# Patient Record
Sex: MALE | Race: WHITE | NOT HISPANIC OR LATINO | Employment: OTHER | URBAN - METROPOLITAN AREA
[De-identification: names, ages, dates, MRNs, and addresses within clinical notes are randomized per-mention and may not be internally consistent; named-entity substitution may affect disease eponyms.]

---

## 2018-03-26 LAB
HCG, QUANTITATIVE (HISTORICAL): 0.98 MU/ML (ref 0–0.5)
LDH (HISTORICAL): 146 IU/L (ref 84–246)

## 2018-03-27 LAB — AFP-TUMOR MARKER (HISTORICAL): 6 NG/ML (ref 0–8.3)

## 2018-08-28 ENCOUNTER — TRANSCRIBE ORDERS (OUTPATIENT)
Dept: ADMINISTRATIVE | Facility: HOSPITAL | Age: 68
End: 2018-08-28

## 2018-08-28 ENCOUNTER — APPOINTMENT (OUTPATIENT)
Dept: LAB | Facility: HOSPITAL | Age: 68
End: 2018-08-28
Attending: INTERNAL MEDICINE
Payer: MEDICARE

## 2018-08-28 DIAGNOSIS — R97.8 ABNORMAL TUMOR MARKERS: ICD-10-CM

## 2018-08-28 DIAGNOSIS — Z85.828 PERSONAL HISTORY OF OTHER MALIGNANT NEOPLASM OF SKIN: ICD-10-CM

## 2018-08-28 DIAGNOSIS — C62.11 MALIGNANT NEOPLASM OF DESCENDED RIGHT TESTIS (HCC): Primary | ICD-10-CM

## 2018-08-28 DIAGNOSIS — C62.11 MALIGNANT NEOPLASM OF DESCENDED RIGHT TESTIS (HCC): ICD-10-CM

## 2018-08-28 LAB
AFP-TM SERPL-MCNC: 5.9 NG/ML (ref 0.5–8)
ALBUMIN SERPL BCP-MCNC: 3.7 G/DL (ref 3.5–5.7)
ALP SERPL-CCNC: 41 U/L (ref 55–165)
ALT SERPL W P-5'-P-CCNC: 15 U/L (ref 7–52)
ANION GAP SERPL CALCULATED.3IONS-SCNC: 8 MMOL/L (ref 4–13)
AST SERPL W P-5'-P-CCNC: 16 U/L (ref 13–39)
BASOPHILS # BLD AUTO: 0 THOUSANDS/ΜL (ref 0–0.1)
BASOPHILS NFR BLD AUTO: 1 % (ref 0–2)
BILIRUB SERPL-MCNC: 0.7 MG/DL (ref 0.2–1)
BUN SERPL-MCNC: 12 MG/DL (ref 7–25)
CALCIUM SERPL-MCNC: 9.3 MG/DL (ref 8.6–10.5)
CHLORIDE SERPL-SCNC: 96 MMOL/L (ref 98–107)
CO2 SERPL-SCNC: 27 MMOL/L (ref 21–31)
CREAT SERPL-MCNC: 0.99 MG/DL (ref 0.7–1.3)
EOSINOPHIL # BLD AUTO: 0.2 THOUSAND/ΜL (ref 0–0.61)
EOSINOPHIL NFR BLD AUTO: 4 % (ref 0–5)
ERYTHROCYTE [DISTWIDTH] IN BLOOD BY AUTOMATED COUNT: 13 % (ref 11.5–14.5)
GFR SERPL CREATININE-BSD FRML MDRD: 77 ML/MIN/1.73SQ M
GLUCOSE P FAST SERPL-MCNC: 102 MG/DL (ref 65–99)
HCG-TM SERPL-SCNC: 1.01 MLU/ML
HCT VFR BLD AUTO: 38.3 % (ref 36.5–49.3)
HGB BLD-MCNC: 13 G/DL (ref 14–18)
LDH SERPL-CCNC: 169 U/L (ref 84–246)
LYMPHOCYTES # BLD AUTO: 1.1 THOUSANDS/ΜL (ref 0.6–4.47)
LYMPHOCYTES NFR BLD AUTO: 21 % (ref 21–51)
MCH RBC QN AUTO: 32.7 PG (ref 26–34)
MCHC RBC AUTO-ENTMCNC: 33.8 G/DL (ref 31–37)
MCV RBC AUTO: 97 FL (ref 81–99)
MONOCYTES # BLD AUTO: 0.7 THOUSAND/ΜL (ref 0.17–1.22)
MONOCYTES NFR BLD AUTO: 14 % (ref 2–12)
NEUTROPHILS # BLD AUTO: 3.2 THOUSANDS/ΜL (ref 1.4–6.5)
NEUTS SEG NFR BLD AUTO: 60 % (ref 42–75)
NRBC BLD AUTO-RTO: 0 /100 WBCS
PLATELET # BLD AUTO: 372 THOUSANDS/UL (ref 149–390)
PMV BLD AUTO: 6.7 FL (ref 8.6–11.7)
POTASSIUM SERPL-SCNC: 4 MMOL/L (ref 3.5–5.5)
PROT SERPL-MCNC: 6.2 G/DL (ref 6.4–8.9)
RBC # BLD AUTO: 3.96 MILLION/UL (ref 4.3–5.9)
SODIUM SERPL-SCNC: 131 MMOL/L (ref 134–143)
WBC # BLD AUTO: 5.3 THOUSAND/UL (ref 4.8–10.8)

## 2018-08-28 PROCEDURE — 82105 ALPHA-FETOPROTEIN SERUM: CPT

## 2018-08-28 PROCEDURE — 84702 CHORIONIC GONADOTROPIN TEST: CPT

## 2018-08-28 PROCEDURE — 36415 COLL VENOUS BLD VENIPUNCTURE: CPT

## 2018-08-28 PROCEDURE — 80053 COMPREHEN METABOLIC PANEL: CPT

## 2018-08-28 PROCEDURE — 83615 LACTATE (LD) (LDH) ENZYME: CPT

## 2018-08-28 PROCEDURE — 85025 COMPLETE CBC W/AUTO DIFF WBC: CPT

## 2018-11-16 ENCOUNTER — OFFICE VISIT (OUTPATIENT)
Dept: URGENT CARE | Facility: CLINIC | Age: 68
End: 2018-11-16
Payer: MEDICARE

## 2018-11-16 VITALS
RESPIRATION RATE: 18 BRPM | TEMPERATURE: 101.8 F | WEIGHT: 206 LBS | HEART RATE: 94 BPM | DIASTOLIC BLOOD PRESSURE: 62 MMHG | OXYGEN SATURATION: 97 % | SYSTOLIC BLOOD PRESSURE: 124 MMHG | HEIGHT: 67 IN | BODY MASS INDEX: 32.33 KG/M2

## 2018-11-16 DIAGNOSIS — R68.89 FLU-LIKE SYMPTOMS: ICD-10-CM

## 2018-11-16 DIAGNOSIS — R50.9 FEVER, UNSPECIFIED FEVER CAUSE: Primary | ICD-10-CM

## 2018-11-16 PROCEDURE — 99203 OFFICE O/P NEW LOW 30 MIN: CPT | Performed by: PHYSICIAN ASSISTANT

## 2018-11-16 PROCEDURE — G0463 HOSPITAL OUTPT CLINIC VISIT: HCPCS | Performed by: PHYSICIAN ASSISTANT

## 2018-11-16 RX ORDER — OSELTAMIVIR PHOSPHATE 75 MG/1
75 CAPSULE ORAL 2 TIMES DAILY
Qty: 10 CAPSULE | Refills: 0 | Status: SHIPPED | OUTPATIENT
Start: 2018-11-16 | End: 2018-11-21

## 2018-11-16 NOTE — PROGRESS NOTES
[facility]        NAME: Hanna Reagan is a 70 y o  male  : 1950    MRN: [id number]  DATE: 2018  TIME: 1:37 PM    Assessment and Plan   Fever, unspecified fever cause [R50 9]  1  Fever, unspecified fever cause     2  Flu-like symptoms  oseltamivir (TAMIFLU) 75 mg capsule         Patient Instructions     1  Fever/Flu-like symptoms  -Patient declines flu swab at this time  -Patient would like to be prescribed Tamiflu  I explained to him that he may have to pay out pocket for the medication  He would still like to proceed  -Increase fluids  -Tylenol/motrin    -Follow-up with PCP within 2-3 days if no improvement  -Go to ER with worsening symptoms, difficulty breathing, continued high fever, new symptoms or any signs of distress    Chief Complaint     Chief Complaint   Patient presents with    Fever     x this a m   Chills     x this a m   Generalized Body Aches     x this a m  History of Present Illness       Patient is a 21-year-old male with no medical history who presents today for evaluation of a fever that started this morning  The patient states that upon waking this morning he had chills and diffuse body aches  Patient states he took his temperature and it was 101° F  Patient states that he did not take any medications prior to arrival   Patient denies having any other symptoms  He did not receive a flu shot this year  Patient is unsure of any sick contacts  Review of Systems   Review of Systems   Constitutional: Positive for chills and fever  HENT: Negative for ear pain, rhinorrhea and sore throat  Respiratory: Negative for cough and shortness of breath  Cardiovascular: Negative for chest pain  Gastrointestinal: Negative for abdominal pain, diarrhea and vomiting  Genitourinary: Negative for difficulty urinating, dysuria and hematuria  Musculoskeletal: Positive for arthralgias  Negative for neck pain  Skin: Negative for rash  Neurological: Negative for headaches  Current Medications       Current Outpatient Prescriptions:     oseltamivir (TAMIFLU) 75 mg capsule, Take 1 capsule (75 mg total) by mouth 2 (two) times a day for 5 days, Disp: 10 capsule, Rfl: 0    Current Allergies     Allergies as of 11/16/2018    (No Known Allergies)            The following portions of the patient's history were reviewed and updated as appropriate: allergies, current medications, past family history, past medical history, past social history, past surgical history and problem list      Past Medical History:   Diagnosis Date    Cancer ([facility] [address] )     Kidney stone        Past Surgical History:   Procedure Laterality Date    HERNIA REPAIR      LUNG REMOVAL, PARTIAL Right 2012    right middle    SKIN BIOPSY      TESTICLE SURGERY Right 2010    removal       Family History   Problem Relation Age of Onset    Cancer Mother     Cancer Father     Cancer Brother          Medications have been verified  Objective   /62 (BP Location: Right arm, Patient Position: Sitting, Cuff Size: Standard)   Pulse 94   Temp (!) 101 8 °F (38 8 °C) (Tympanic)   Resp 18   Ht 5' 7" (1 702 m)   Wt 93 4 kg (206 lb)   SpO2 97%   BMI 32 26 kg/m²        Physical Exam     Physical Exam   Constitutional: He is oriented to person, place, and time  He appears well-developed and well-nourished  No distress  HENT:   Right Ear: Tympanic membrane and external ear normal    Left Ear: Tympanic membrane and external ear normal    Nose: Nose normal    Mouth/Throat: Uvula is midline, oropharynx is clear and moist and mucous membranes are normal    Eyes: Pupils are equal, round, and reactive to light  Conjunctivae and EOM are normal    Neck: Normal range of motion  Neck supple  Cardiovascular: Normal rate, regular rhythm and normal heart sounds  Pulmonary/Chest: Effort normal and breath sounds normal  He has no wheezes  He has no rales  Abdominal: Soft   Bowel sounds are normal  There is no tenderness  There is no rebound and no guarding  Musculoskeletal: Normal range of motion  Lymphadenopathy:     He has no cervical adenopathy  Neurological: He is alert and oriented to person, place, and time  Skin: Skin is warm and dry  Psychiatric: He has a normal mood and affect  Nursing note and vitals reviewed

## 2018-11-16 NOTE — PATIENT INSTRUCTIONS
1  Fever/Flu-like symptoms  -Patient declines flu swab at this time  -Patient would like to be prescribed Tamiflu  -Increase fluids  -Tylenol/motrin    -Follow-up with PCP within 2-3 days if no improvement  -Go to ER with worsening symptoms, difficulty breathing, continued high fever, new symptoms or any signs of distress    Influenza   AMBULATORY CARE:   Influenza  (the flu) is an infection caused by the influenza virus  The flu is easily spread when an infected person coughs, sneezes, or has close contact with others  You may be able to spread the flu to others for 1 week or longer after signs or symptoms appear  Common signs and symptoms include the following:   · Fever and chills    · Headaches, body aches, and muscle or joint pain    · Cough, runny nose, and sore throat    · Loss of appetite, nausea, vomiting, or diarrhea    · Tiredness    · Trouble breathing  Call 911 for any of the following:   · You have trouble breathing, and your lips look purple or blue  · You have a seizure  Seek care immediately if:   · You are dizzy, or you are urinating less or not at all  · You have a headache with a stiff neck, and you feel tired or confused  · You have new pain or pressure in your chest     · Your symptoms, such as shortness of breath, vomiting, or diarrhea, get worse  · Your symptoms, such as fever and coughing, seem to get better, but then get worse  Contact your healthcare provider if:   · You have new muscle pain or weakness  · You have questions or concerns about your condition or care  Treatment for influenza  may include any of the following:  · Acetaminophen  decreases pain and fever  It is available without a doctor's order  Ask how much to take and how often to take it  Follow directions  Acetaminophen can cause liver damage if not taken correctly  · NSAIDs , such as ibuprofen, help decrease swelling, pain, and fever  This medicine is available with or without a doctor's order  NSAIDs can cause stomach bleeding or kidney problems in certain people  If you take blood thinner medicine, always ask your healthcare provider if NSAIDs are safe for you  Always read the medicine label and follow directions  · Antivirals  help fight a viral infection  Manage your symptoms:   · Rest  as much as you can to help you recover  · Drink liquids as directed  to help prevent dehydration  Ask how much liquid to drink each day and which liquids are best for you  Prevent the spread of the flu:   · Wash your hands often  Use soap and water  Wash your hands after you use the bathroom, change a child's diapers, or sneeze  Wash your hands before you prepare or eat food  Use gel hand cleanser when soap and water are not available  Do not touch your eyes, nose, or mouth unless you have washed your hands first            · Cover your mouth when you sneeze or cough  Cough into a tissue or the bend of your arm  · Clean shared items with a germ-killing   Clean table surfaces, doorknobs, and light switches  Do not share towels, silverware, and dishes with people who are sick  Wash bed sheets, towels, silverware, and dishes with soap and water  · Wear a mask  over your mouth and nose if you are sick or are near anyone who is sick  · Stay away from others  if you are sick  · Influenza vaccine  helps prevent influenza (flu)  Everyone older than 6 months should get a yearly influenza vaccine  Get the vaccine as soon as it is available, usually in September or October each year  Follow up with your healthcare provider as directed:  Write down your questions so you remember to ask them during your visits  © 2017 [facility] Information is for End User's use only and may not be sold, redistributed or otherwise used for commercial purposes  All illustrations and images included in CareNotes® are the copyrighted property of [employer]  or Clement[employer]    The above information is an  only  It is not intended as medical advice for individual conditions or treatments  Talk to your doctor, nurse or pharmacist before following any medical regimen to see if it is safe and effective for you

## 2019-02-25 ENCOUNTER — TRANSCRIBE ORDERS (OUTPATIENT)
Dept: ADMINISTRATIVE | Facility: HOSPITAL | Age: 69
End: 2019-02-25

## 2019-02-25 ENCOUNTER — APPOINTMENT (OUTPATIENT)
Dept: LAB | Facility: HOSPITAL | Age: 69
End: 2019-02-25
Attending: INTERNAL MEDICINE
Payer: MEDICARE

## 2019-02-25 ENCOUNTER — HOSPITAL ENCOUNTER (OUTPATIENT)
Dept: RADIOLOGY | Facility: HOSPITAL | Age: 69
Discharge: HOME | End: 2019-02-25
Attending: INTERNAL MEDICINE
Payer: MEDICARE

## 2019-02-25 DIAGNOSIS — Z85.828 HISTORY OF SQUAMOUS CELL CARCINOMA OF SKIN: ICD-10-CM

## 2019-02-25 DIAGNOSIS — R97.8 ABNORMAL TUMOR MARKERS: ICD-10-CM

## 2019-02-25 DIAGNOSIS — R97.8 ABNORMAL TUMOR MARKERS: Primary | ICD-10-CM

## 2019-02-25 DIAGNOSIS — C62.11 MALIGNANT NEOPLASM OF RIGHT DESCENDED TESTIS (HCC): ICD-10-CM

## 2019-02-25 DIAGNOSIS — K21.9 GASTROESOPHAGEAL REFLUX DISEASE WITHOUT ESOPHAGITIS: Primary | ICD-10-CM

## 2019-02-25 LAB
AFP-TM SERPL-MCNC: 5.6 NG/ML (ref 0.5–8)
ALBUMIN SERPL BCP-MCNC: 3.9 G/DL (ref 3.5–5.7)
ALP SERPL-CCNC: 38 U/L (ref 55–165)
ALT SERPL W P-5'-P-CCNC: 14 U/L (ref 7–52)
ANION GAP SERPL CALCULATED.3IONS-SCNC: 6 MMOL/L (ref 4–13)
AST SERPL W P-5'-P-CCNC: 16 U/L (ref 13–39)
B-HCG SERPL-ACNC: 0.94 MIU/ML (ref 0–0.6)
BASOPHILS # BLD AUTO: 0.1 THOUSANDS/ΜL (ref 0–0.1)
BASOPHILS NFR BLD AUTO: 1 % (ref 0–2)
BILIRUB SERPL-MCNC: 0.6 MG/DL (ref 0.2–1)
BUN SERPL-MCNC: 14 MG/DL (ref 7–25)
CALCIUM SERPL-MCNC: 9.1 MG/DL (ref 8.6–10.5)
CHLORIDE SERPL-SCNC: 97 MMOL/L (ref 98–107)
CO2 SERPL-SCNC: 28 MMOL/L (ref 21–31)
CREAT SERPL-MCNC: 0.94 MG/DL (ref 0.7–1.3)
EOSINOPHIL # BLD AUTO: 0.2 THOUSAND/ΜL (ref 0–0.61)
EOSINOPHIL NFR BLD AUTO: 3 % (ref 0–5)
ERYTHROCYTE [DISTWIDTH] IN BLOOD BY AUTOMATED COUNT: 12.8 % (ref 11.5–14.5)
GFR SERPL CREATININE-BSD FRML MDRD: 82 ML/MIN/1.73SQ M
GLUCOSE SERPL-MCNC: 85 MG/DL (ref 65–99)
HCT VFR BLD AUTO: 36 % (ref 42–47)
HGB BLD-MCNC: 12.4 G/DL (ref 14–18)
LYMPHOCYTES # BLD AUTO: 1.5 THOUSANDS/ΜL (ref 0.6–4.47)
LYMPHOCYTES NFR BLD AUTO: 27 % (ref 21–51)
MCH RBC QN AUTO: 33.4 PG (ref 26–34)
MCHC RBC AUTO-ENTMCNC: 34.4 G/DL (ref 31–37)
MCV RBC AUTO: 97 FL (ref 81–99)
MONOCYTES # BLD AUTO: 0.8 THOUSAND/ΜL (ref 0.17–1.22)
MONOCYTES NFR BLD AUTO: 15 % (ref 2–12)
NEUTROPHILS # BLD AUTO: 3 THOUSANDS/ΜL (ref 1.4–6.5)
NEUTS SEG NFR BLD AUTO: 54 % (ref 42–75)
NRBC BLD AUTO-RTO: 0 /100 WBCS
PLATELET # BLD AUTO: 329 THOUSANDS/UL (ref 149–390)
PMV BLD AUTO: 7.3 FL (ref 8.6–11.7)
POTASSIUM SERPL-SCNC: 4.4 MMOL/L (ref 3.5–5.5)
PROT SERPL-MCNC: 6.4 G/DL (ref 6.4–8.9)
RBC # BLD AUTO: 3.72 MILLION/UL (ref 4.3–5.9)
SODIUM SERPL-SCNC: 131 MMOL/L (ref 134–143)
WBC # BLD AUTO: 5.6 THOUSAND/UL (ref 4.8–10.8)

## 2019-02-25 PROCEDURE — 82105 ALPHA-FETOPROTEIN SERUM: CPT

## 2019-02-25 PROCEDURE — 83615 LACTATE (LD) (LDH) ENZYME: CPT

## 2019-02-25 PROCEDURE — 83625 ASSAY OF LDH ENZYMES: CPT

## 2019-02-25 PROCEDURE — 77075 RADEX OSSEOUS SURVEY COMPL: CPT

## 2019-02-25 PROCEDURE — 85025 COMPLETE CBC W/AUTO DIFF WBC: CPT

## 2019-02-25 PROCEDURE — 84702 CHORIONIC GONADOTROPIN TEST: CPT

## 2019-02-25 PROCEDURE — 80053 COMPREHEN METABOLIC PANEL: CPT

## 2019-02-25 PROCEDURE — 36415 COLL VENOUS BLD VENIPUNCTURE: CPT

## 2019-02-25 RX ORDER — OMEPRAZOLE 20 MG/1
20 CAPSULE, DELAYED RELEASE ORAL DAILY
Qty: 30 CAPSULE | Refills: 5 | Status: SHIPPED | OUTPATIENT
Start: 2019-02-25 | End: 2019-09-30 | Stop reason: SDUPTHER

## 2019-02-25 NOTE — TELEPHONE ENCOUNTER
Patient requesting refill of omeprazole taken for GERD  Has appointment in 06/2019  Will provide 6 month refill  Will re-evaluate at his next appointment for long term use

## 2019-02-26 LAB
LDH SERPL-CCNC: 165 IU/L (ref 121–224)
LDH1 CFR SERPL ELPH: 28 % (ref 17–32)
LDH2 CFR SERPL ELPH: 36 % (ref 25–40)
LDH3 CFR SERPL ELPH: 22 % (ref 17–27)
LDH4 CFR SERPL ELPH: 7 % (ref 5–13)
LDH5 CFR SERPL ELPH: 7 % (ref 4–20)

## 2019-03-15 ENCOUNTER — TRANSCRIBE ORDERS (OUTPATIENT)
Dept: ADMINISTRATIVE | Facility: HOSPITAL | Age: 69
End: 2019-03-15

## 2019-03-15 DIAGNOSIS — R97.8 ABNORMAL TUMOR MARKERS: Primary | ICD-10-CM

## 2019-04-26 ENCOUNTER — HOSPITAL ENCOUNTER (OUTPATIENT)
Dept: ULTRASOUND IMAGING | Facility: HOSPITAL | Age: 69
Discharge: HOME | End: 2019-04-26
Attending: INTERNAL MEDICINE
Payer: MEDICARE

## 2019-04-26 ENCOUNTER — TRANSCRIBE ORDERS (OUTPATIENT)
Dept: ADMINISTRATIVE | Facility: HOSPITAL | Age: 69
End: 2019-04-26

## 2019-04-26 ENCOUNTER — APPOINTMENT (OUTPATIENT)
Dept: LAB | Facility: HOSPITAL | Age: 69
End: 2019-04-26
Attending: INTERNAL MEDICINE
Payer: MEDICARE

## 2019-04-26 DIAGNOSIS — R97.8 ABNORMAL TUMOR MARKERS: ICD-10-CM

## 2019-04-26 DIAGNOSIS — R97.8 ABNORMAL TUMOR MARKERS: Primary | ICD-10-CM

## 2019-04-26 DIAGNOSIS — C44.92 SQUAMOUS CELL CARCINOMA OF SKIN: ICD-10-CM

## 2019-04-26 DIAGNOSIS — C62.11 MALIGNANT NEOPLASM OF DESCENDED RIGHT TESTIS (HCC): ICD-10-CM

## 2019-04-26 LAB
FERRITIN SERPL-MCNC: 74 NG/ML (ref 8–388)
FOLATE SERPL-MCNC: >20 NG/ML (ref 3.1–17.5)
RETICS # AUTO: NORMAL 10*3/UL (ref 14356–105094)
RETICS # CALC: 1.2 % (ref 0.37–1.87)
TIBC SERPL-MCNC: 319 UG/DL (ref 250–450)
TSH SERPL DL<=0.05 MIU/L-ACNC: 2.11 UIU/ML (ref 0.45–5.33)
VIT B12 SERPL-MCNC: 299 PG/ML (ref 100–900)

## 2019-04-26 PROCEDURE — 84443 ASSAY THYROID STIM HORMONE: CPT

## 2019-04-26 PROCEDURE — 76700 US EXAM ABDOM COMPLETE: CPT

## 2019-04-26 PROCEDURE — 83550 IRON BINDING TEST: CPT

## 2019-04-26 PROCEDURE — 82728 ASSAY OF FERRITIN: CPT

## 2019-04-26 PROCEDURE — 82746 ASSAY OF FOLIC ACID SERUM: CPT

## 2019-04-26 PROCEDURE — 84165 PROTEIN E-PHORESIS SERUM: CPT

## 2019-04-26 PROCEDURE — 82607 VITAMIN B-12: CPT

## 2019-04-26 PROCEDURE — 36415 COLL VENOUS BLD VENIPUNCTURE: CPT

## 2019-04-26 PROCEDURE — 84165 PROTEIN E-PHORESIS SERUM: CPT | Performed by: PATHOLOGY

## 2019-04-26 PROCEDURE — 85045 AUTOMATED RETICULOCYTE COUNT: CPT

## 2019-04-30 LAB
ALBUMIN SERPL ELPH-MCNC: 3.91 G/DL (ref 3.5–5)
ALBUMIN SERPL ELPH-MCNC: 60.2 % (ref 52–65)
ALPHA1 GLOB SERPL ELPH-MCNC: 0.25 G/DL (ref 0.1–0.4)
ALPHA1 GLOB SERPL ELPH-MCNC: 3.9 % (ref 2.5–5)
ALPHA2 GLOB SERPL ELPH-MCNC: 0.6 G/DL (ref 0.4–1.2)
ALPHA2 GLOB SERPL ELPH-MCNC: 9.2 % (ref 7–13)
BETA GLOB ABNORMAL SERPL ELPH-MCNC: 0.41 G/DL (ref 0.4–0.8)
BETA1 GLOB SERPL ELPH-MCNC: 6.3 % (ref 5–13)
BETA2 GLOB SERPL ELPH-MCNC: 5 % (ref 2–8)
BETA2+GAMMA GLOB SERPL ELPH-MCNC: 0.33 G/DL (ref 0.2–0.5)
GAMMA GLOB ABNORMAL SERPL ELPH-MCNC: 1 G/DL (ref 0.5–1.6)
GAMMA GLOB SERPL ELPH-MCNC: 15.4 % (ref 12–22)
IGG/ALB SER: 1.51 {RATIO} (ref 1.1–1.8)
PROT PATTERN SERPL ELPH-IMP: NORMAL
PROT SERPL-MCNC: 6.5 G/DL (ref 6.4–8.2)

## 2019-05-10 ENCOUNTER — OFFICE VISIT (OUTPATIENT)
Dept: URGENT CARE | Facility: CLINIC | Age: 69
End: 2019-05-10
Payer: MEDICARE

## 2019-05-10 VITALS
TEMPERATURE: 98.1 F | BODY MASS INDEX: 30.62 KG/M2 | RESPIRATION RATE: 18 BRPM | DIASTOLIC BLOOD PRESSURE: 88 MMHG | HEIGHT: 68 IN | OXYGEN SATURATION: 98 % | HEART RATE: 62 BPM | WEIGHT: 202 LBS | SYSTOLIC BLOOD PRESSURE: 162 MMHG
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
## 2019-05-10 DIAGNOSIS — S30.860A TICK BITE OF BUTTOCK, INITIAL ENCOUNTER: Primary | ICD-10-CM

## 2019-05-10 PROCEDURE — G0463 HOSPITAL OUTPT CLINIC VISIT: HCPCS | Performed by: PHYSICIAN ASSISTANT

## 2019-05-10 PROCEDURE — 99213 OFFICE O/P EST LOW 20 MIN: CPT | Performed by: PHYSICIAN ASSISTANT

## 2019-05-10 RX ORDER — DOXYCYCLINE 100 MG/1
200 TABLET ORAL ONCE
Qty: 2 TABLET | Refills: 0 | Status: SHIPPED | OUTPATIENT
Start: 2019-05-10 | End: 2019-05-10

## 2019-06-19 ENCOUNTER — PROCEDURE VISIT (OUTPATIENT)
Dept: SURGERY | Facility: CLINIC | Age: 69
End: 2019-06-19

## 2019-06-19 VITALS
TEMPERATURE: 97.7 F | WEIGHT: 199 LBS | HEART RATE: 68 BPM | BODY MASS INDEX: 30.16 KG/M2 | HEIGHT: 68 IN | SYSTOLIC BLOOD PRESSURE: 142 MMHG | RESPIRATION RATE: 18 BRPM | DIASTOLIC BLOOD PRESSURE: 82 MMHG

## 2019-06-19 DIAGNOSIS — Z12.11 ENCOUNTER FOR SCREENING COLONOSCOPY: ICD-10-CM

## 2019-06-19 DIAGNOSIS — K21.9 GASTROESOPHAGEAL REFLUX DISEASE WITHOUT ESOPHAGITIS: ICD-10-CM

## 2019-06-19 DIAGNOSIS — Z86.010 HISTORY OF COLON POLYPS: ICD-10-CM

## 2019-06-19 DIAGNOSIS — K22.70 BARRETT'S ESOPHAGUS WITHOUT DYSPLASIA: Primary | ICD-10-CM

## 2019-06-19 PROBLEM — K57.90 DIVERTICULOSIS: Status: ACTIVE | Noted: 2019-06-19

## 2019-06-19 PROBLEM — Z86.0100 HISTORY OF COLON POLYPS: Status: ACTIVE | Noted: 2019-06-19

## 2019-06-24 RX ORDER — SODIUM CHLORIDE, SODIUM LACTATE, POTASSIUM CHLORIDE, CALCIUM CHLORIDE 600; 310; 30; 20 MG/100ML; MG/100ML; MG/100ML; MG/100ML
100 INJECTION, SOLUTION INTRAVENOUS CONTINUOUS
Status: CANCELLED | OUTPATIENT
Start: 2019-06-24

## 2019-06-28 NOTE — PRE-PROCEDURE INSTRUCTIONS
Pre-Surgery Instructions:   Medication Instructions    LOPEZ PO Instructed patient per Anesthesia Guidelines  Last dose on 7/1    omeprazole (PriLOSEC) 20 mg delayed release capsule Instructed patient per Anesthesia Guidelines  Last dose on DOS 7/2 with a sip of water    Spoke to Mignon    My Surgical Experience    The following information was developed to assist you to prepare for your operation  What do I need to do before coming to the hospital?   Arrange for a responsible person to drive you to and from the hospital    Arrange care for your children at home  Children are not allowed in the recovery areas of the hospital  Plan to wear clothing that is easy to put on and take off  Bathing  o Shower before your procedure  o Remove nail polish and all body piercing jewelry  o Do not shave any body part for at least 24 hours before surgery-this includes face, arms, legs and upper body  Food  o Nothing to eat or drink after midnight the night before your surgery  This includes candy and chewing gum  o Follow prep instructions from office  o Do not drink alcohol 24 hours before surgery  Medicine  o Follow instructions you received from your surgeon about which medicines you may take on the day of surgery  o If instructed to take medicine on the morning of surgery, take pills with just a small sip of water  Call your prescribing doctor for specific infroamtion on what to do if you take insulin    What should I bring to the hospital?    Bring:  Leory Brush or a walker, if you have them, for foot or knee surgery   A list of the daily medicines, vitamins, minerals, herbals and nutritional supplements you take   Include the dosages of medicines and the time you take them each day   Glasses, dentures or hearing aids   Minimal clothing; you will be wearing hospital sleepwear   Photo ID; required to verify your identity   If you have a Living Will or Power of , bring a copy of the documents   If you have an ostomy, bring an extra pouch and any supplies you use    Do not bring   Medicines or inhalers   Money, valuables or jewelry    What other information should I know about the day of surgery?  Notify your surgeons if you develop a cold, sore throat, cough, fever, rash or any other illness   Report to the Ambulatory Surgical/Same Day Surgery Unit   You will be instructed to stop at Registration only if you have not been pre-registered   Inform your  fi they do not stay that they will be asked by the staff to leave a phone number where they can be reached   Be available to be reached before surgery  In the event the operating room schedule changes, you may be asked to come in earlier or later than expected    *It is important to tell your doctor and others involved in your health care if you are taking or have been taking any non-prescription drugs, vitamins, minerals, herbals or other nutritional supplements   Any of these may interact with some food or medicines and cause a reaction

## 2019-07-02 ENCOUNTER — HOSPITAL ENCOUNTER (OUTPATIENT)
Dept: GASTROENTEROLOGY | Facility: HOSPITAL | Age: 69
Setting detail: OUTPATIENT SURGERY
Discharge: HOME | End: 2019-07-02
Attending: SURGERY | Admitting: SURGERY
Payer: MEDICARE

## 2019-07-02 ENCOUNTER — ANESTHESIA EVENT (OUTPATIENT)
Dept: GASTROENTEROLOGY | Facility: HOSPITAL | Age: 69
End: 2019-07-02

## 2019-07-02 ENCOUNTER — ANESTHESIA (OUTPATIENT)
Dept: GASTROENTEROLOGY | Facility: HOSPITAL | Age: 69
End: 2019-07-02

## 2019-07-02 VITALS
DIASTOLIC BLOOD PRESSURE: 86 MMHG | HEART RATE: 60 BPM | SYSTOLIC BLOOD PRESSURE: 174 MMHG | RESPIRATION RATE: 16 BRPM | TEMPERATURE: 98 F | OXYGEN SATURATION: 100 %

## 2019-07-02 DIAGNOSIS — K21.9 GASTROESOPHAGEAL REFLUX DISEASE WITHOUT ESOPHAGITIS: ICD-10-CM

## 2019-07-02 DIAGNOSIS — Z86.010 HISTORY OF COLON POLYPS: ICD-10-CM

## 2019-07-02 DIAGNOSIS — Z12.11 ENCOUNTER FOR SCREENING COLONOSCOPY: ICD-10-CM

## 2019-07-02 DIAGNOSIS — K22.70 BARRETT'S ESOPHAGUS WITHOUT DYSPLASIA: ICD-10-CM

## 2019-07-02 PROBLEM — C62.11 MALIGNANT NEOPLASM OF DESCENDED RIGHT TESTIS (HCC): Status: ACTIVE | Noted: 2017-04-18

## 2019-07-02 PROBLEM — K64.2 PROLAPSED INTERNAL HEMORRHOIDS, GRADE 3: Status: ACTIVE | Noted: 2019-07-02

## 2019-07-02 PROBLEM — D12.0 ADENOMATOUS POLYP OF CECUM: Status: ACTIVE | Noted: 2019-07-02

## 2019-07-02 PROBLEM — N43.3 HYDROCELE OF TESTIS: Status: ACTIVE | Noted: 2017-04-18

## 2019-07-02 PROBLEM — K86.2 CYST OF PANCREAS: Status: ACTIVE | Noted: 2019-05-03

## 2019-07-02 PROBLEM — Z85.828 HISTORY OF SQUAMOUS CELL CARCINOMA OF SKIN: Status: ACTIVE | Noted: 2017-04-18

## 2019-07-02 PROBLEM — M10.9 GOUT: Status: ACTIVE | Noted: 2017-04-18

## 2019-07-02 PROCEDURE — 43239 EGD BIOPSY SINGLE/MULTIPLE: CPT | Performed by: SURGERY

## 2019-07-02 PROCEDURE — 1123F ACP DISCUSS/DSCN MKR DOCD: CPT | Performed by: SURGERY

## 2019-07-02 PROCEDURE — 88305 TISSUE EXAM BY PATHOLOGIST: CPT | Performed by: PATHOLOGY

## 2019-07-02 PROCEDURE — NC001 PR NO CHARGE: Performed by: SURGERY

## 2019-07-02 PROCEDURE — 88342 IMHCHEM/IMCYTCHM 1ST ANTB: CPT | Performed by: PATHOLOGY

## 2019-07-02 PROCEDURE — 45384 COLONOSCOPY W/LESION REMOVAL: CPT | Performed by: SURGERY

## 2019-07-02 RX ORDER — SODIUM CHLORIDE, SODIUM LACTATE, POTASSIUM CHLORIDE, CALCIUM CHLORIDE 600; 310; 30; 20 MG/100ML; MG/100ML; MG/100ML; MG/100ML
125 INJECTION, SOLUTION INTRAVENOUS CONTINUOUS
Status: DISCONTINUED | OUTPATIENT
Start: 2019-07-02 | End: 2019-07-06 | Stop reason: HOSPADM

## 2019-07-02 RX ORDER — PROPOFOL 10 MG/ML
INJECTION, EMULSION INTRAVENOUS AS NEEDED
Status: DISCONTINUED | OUTPATIENT
Start: 2019-07-02 | End: 2019-07-02 | Stop reason: SURG

## 2019-07-02 RX ADMIN — PROPOFOL 30 MG: 10 INJECTION, EMULSION INTRAVENOUS at 08:07

## 2019-07-02 RX ADMIN — PROPOFOL 20 MG: 10 INJECTION, EMULSION INTRAVENOUS at 07:44

## 2019-07-02 RX ADMIN — PROPOFOL 10 MG: 10 INJECTION, EMULSION INTRAVENOUS at 08:16

## 2019-07-02 RX ADMIN — PROPOFOL 30 MG: 10 INJECTION, EMULSION INTRAVENOUS at 07:49

## 2019-07-02 RX ADMIN — PROPOFOL 30 MG: 10 INJECTION, EMULSION INTRAVENOUS at 07:53

## 2019-07-02 RX ADMIN — PROPOFOL 20 MG: 10 INJECTION, EMULSION INTRAVENOUS at 08:01

## 2019-07-02 RX ADMIN — PROPOFOL 20 MG: 10 INJECTION, EMULSION INTRAVENOUS at 08:11

## 2019-07-02 RX ADMIN — PROPOFOL 150 MG: 10 INJECTION, EMULSION INTRAVENOUS at 07:41

## 2019-07-02 RX ADMIN — SODIUM CHLORIDE, SODIUM LACTATE, POTASSIUM CHLORIDE, AND CALCIUM CHLORIDE: .6; .31; .03; .02 INJECTION, SOLUTION INTRAVENOUS at 07:29

## 2019-07-02 RX ADMIN — PROPOFOL 20 MG: 10 INJECTION, EMULSION INTRAVENOUS at 07:56

## 2019-07-02 NOTE — ANESTHESIA POSTPROCEDURE EVALUATION
Post-Op Assessment Note    CV Status:  Stable  Pain Score: 0    Pain management: adequate     Mental Status:  Alert   Hydration Status:  Stable   PONV Controlled:  None   Airway Patency:  Patent   Post Op Vitals Reviewed: Yes      Staff: Anesthesiologist           BP (!) 174/86 (07/02/19 0831)    Temp 98 °F (36 7 °C) (07/02/19 0831)    Pulse 60 (07/02/19 0831)   Resp 16 (07/02/19 0831)    SpO2 100 % (07/02/19 0831)

## 2019-07-02 NOTE — ANESTHESIA PREPROCEDURE EVALUATION
Review of Systems/Medical History  Patient summary reviewed  Chart reviewed      Cardiovascular   Pulmonary       GI/Hepatic    GERD ,        Kidney stones,        Endo/Other     GYN       Hematology   Musculoskeletal    Arthritis     Neurology   Psychology         Lab Results   Component Value Date    WBC 5 60 02/25/2019    HGB 12 4 (L) 02/25/2019    HCT 36 0 (L) 02/25/2019    MCV 97 02/25/2019     02/25/2019     Lab Results   Component Value Date    CALCIUM 9 1 02/25/2019    K 4 4 02/25/2019    CO2 28 02/25/2019    CL 97 (L) 02/25/2019    BUN 14 02/25/2019    CREATININE 0 94 02/25/2019     No results found for: INR, PROTIME  No results found for: PTT  Physical Exam    Airway    Mallampati score: III  TM Distance: >3 FB  Neck ROM: full     Dental   No notable dental hx     Cardiovascular  Cardiovascular exam normal    Pulmonary  Pulmonary exam normal     Other Findings        Anesthesia Plan  ASA Score- 3     Anesthesia Type- IV sedation with anesthesia with ASA Monitors  Additional Monitors:   Airway Plan:     Comment: Plan discussed is MAC with GA as backup  I personally discussed risks and benefits to this anesthetic  All patient questions were answered  Pt agrees with anesthesia plan        Plan Factors-    Induction-     Postoperative Plan-     Informed Consent- Anesthetic plan and risks discussed with patient

## 2019-07-02 NOTE — DISCHARGE INSTRUCTIONS
Upper Endoscopy   WHAT YOU NEED TO KNOW:   An upper endoscopy is also called an upper gastrointestinal (GI) endoscopy, or an esophagogastroduodenoscopy (EGD)  You may feel bloated, gassy, or have some abdominal discomfort after your procedure  Your throat may be sore for 24 to 36 hours  You may burp or pass gas from air that is still inside your body  DISCHARGE INSTRUCTIONS:   Call 911 if:   · You have sudden chest pain or trouble breathing  Seek care immediately if:   · You feel dizzy or faint  · You have trouble swallowing  · You have severe throat pain  · Your bowel movements are very dark or black  · Your abdomen is hard and firm and you have severe pain  · You vomit blood  Contact your healthcare provider if:   · You feel full or bloated and cannot burp or pass gas  · You have not had a bowel movement for 3 days after your procedure  · You have neck pain  · You have a fever or chills  · You have nausea or are vomiting  · You have a rash or hives  · You have questions or concerns about your endoscopy  Relieve a sore throat:  Suck on throat lozenges or crushed ice  Gargle with a small amount of warm salt water  Mix 1 teaspoon of salt and 1 cup of warm water to make salt water  Relieve gas and discomfort from bloating:  Lie on your right side with a heating pad on your abdomen  Take short walks to help pass gas  Eat small meals until bloating is relieved  Rest after your procedure:  Do not drive or make important decisions until the day after your procedure  Return to your normal activity as directed  You can usually return to work the day after your procedure  Follow up with your healthcare provider as directed:  Write down your questions so you remember to ask them during your visits  © 2017 [employer]  Information is for End User's use only and may not be sold, redistributed or otherwise used for commercial purposes   All illustrations and images included in [employer] are the copyrighted property of [employer]  or Clemente Mayo  The above information is an  only  It is not intended as medical advice for individual conditions or treatments  Talk to your doctor, nurse or pharmacist before following any medical regimen to see if it is safe and effective for you

## 2019-07-17 ENCOUNTER — OFFICE VISIT (OUTPATIENT)
Dept: SURGERY | Facility: CLINIC | Age: 69
End: 2019-07-17
Payer: MEDICARE

## 2019-07-17 DIAGNOSIS — Z12.11 ENCOUNTER FOR SCREENING COLONOSCOPY: ICD-10-CM

## 2019-07-17 DIAGNOSIS — D12.0 ADENOMATOUS POLYP OF CECUM: Primary | ICD-10-CM

## 2019-07-17 DIAGNOSIS — K22.70 BARRETT'S ESOPHAGUS WITHOUT DYSPLASIA: ICD-10-CM

## 2019-07-17 DIAGNOSIS — K57.30 DIVERTICULAR DISEASE OF LARGE INTESTINE: ICD-10-CM

## 2019-07-17 PROCEDURE — 99212 OFFICE O/P EST SF 10 MIN: CPT | Performed by: SURGERY

## 2019-07-17 PROCEDURE — 1123F ACP DISCUSS/DSCN MKR DOCD: CPT | Performed by: SURGERY

## 2019-07-17 NOTE — ASSESSMENT & PLAN NOTE
Patient is stable Roque's esophagus without dysplasia on repeat EGD with biopsy  Advised to continue with PPI therapy  Per current guidelines I advised repeat EGD in 3 years  Will discuss with Dr Makenzie Blackburn  Questions answered, will place and recall

## 2019-07-17 NOTE — PROGRESS NOTES
Post-Op Note - General Surgery   Shelvy Barthel 78 y o  male MRN: [id number]  Encounter: [id number]    Assessment/Plan    Encounter for screening colonoscopy  Patient has completed routine screening colonoscopy  No evidence of colon cancer  Single tubular adenoma removed without high-grade dysplasia  I have advised repeat screening in 3 years  Questions answered, placed in recall  Diverticular disease of large intestine  The patient has severe and extensive diverticulosis identified on colonoscopy  There is a component of luminal narrowing suggesting chronic diverticular stricture  Fortunately the patient denies prior history of diverticulitis episodes  I advised to follow a high fiber diet  I advised to monitor for signs and symptoms of diverticulitis as well as bowel obstruction  I do not believe elective sigmoid resection is indicated at this time  Will reassess at the time of his next colonoscopy in 3 years  Questions answered, patient agreeable to plan  Roque's esophagus without dysplasia  Patient is stable Roque's esophagus without dysplasia on repeat EGD with biopsy  Advised to continue with PPI therapy  Per current guidelines I advised repeat EGD in 3 years  Will discuss with Dr Alexandro Stevens  Questions answered, will place and recall  Adenomatous polyp of cecum  Patient is status post colonoscopy with single polypectomy  Pathology reveals tubular adenoma without high-grade dysplasia  Finding was explained to the patient  I advised repeat colonoscopy in 3 years  Questions answered, follow-up arranged  Diagnoses and all orders for this visit:    Adenomatous polyp of cecum    Encounter for screening colonoscopy    Diverticular disease of large intestine    Roque's esophagus without dysplasia        Subjective    No chief complaint on file  Pleasant 56-year-old male with history of colon polyps and Roque's esophagus returns to the office after repeat EGD and colonoscopy  Denies any problems after the procedure  No nausea, vomiting, abdominal pain, diarrhea, or constipation  Denies prior history of diverticulitis  Review of Systems   Constitutional: Negative for chills and fever  Respiratory: Negative for shortness of breath  Cardiovascular: Negative for chest pain  Gastrointestinal: Negative for abdominal pain  Skin: Negative for rash  Neurological: Negative for headaches  The following portions of the patient's history were reviewed and updated as appropriate: allergies, current medications, past family history, past medical history, past social history, past surgical history and problem list     Objective      There were no vitals taken for this visit  Physical Exam   Constitutional: He is oriented to person, place, and time  He appears well-developed and well-nourished  No distress  HENT:   Head: Normocephalic and atraumatic  Eyes: Pupils are equal, round, and reactive to light  EOM are normal    Neck: Normal range of motion  Cardiovascular: Normal rate and regular rhythm  No murmur heard  Pulmonary/Chest: Effort normal and breath sounds normal  No respiratory distress  Abdominal: Soft  Bowel sounds are normal  He exhibits no distension  There is no tenderness  Musculoskeletal: Normal range of motion  Neurological: He is alert and oriented to person, place, and time  Skin: Skin is warm and dry  Capillary refill takes less than 2 seconds  No rash noted  He is not diaphoretic  Psychiatric: He has a normal mood and affect   His behavior is normal        Signature:  Owen Bean PA-C  Date: 7/17/2019 Time: 11:17 AM

## 2019-07-17 NOTE — ASSESSMENT & PLAN NOTE
Patient has hemorrhoidal disease identified at the time of colonoscopy  Currently asymptomatic  No treatment indicated at this time  Advised to follow high-fiber diet

## 2019-07-17 NOTE — ASSESSMENT & PLAN NOTE
Patient is status post colonoscopy with single polypectomy  Pathology reveals tubular adenoma without high-grade dysplasia  Finding was explained to the patient  I advised repeat colonoscopy in 3 years  Questions answered, follow-up arranged

## 2019-07-17 NOTE — ASSESSMENT & PLAN NOTE
The patient has severe and extensive diverticulosis identified on colonoscopy  There is a component of luminal narrowing suggesting chronic diverticular stricture  Fortunately the patient denies prior history of diverticulitis episodes  I advised to follow a high fiber diet  I advised to monitor for signs and symptoms of diverticulitis as well as bowel obstruction  I do not believe elective sigmoid resection is indicated at this time  Will reassess at the time of his next colonoscopy in 3 years  Questions answered, patient agreeable to plan

## 2019-07-17 NOTE — ASSESSMENT & PLAN NOTE
Patient has completed routine screening colonoscopy  No evidence of colon cancer  Single tubular adenoma removed without high-grade dysplasia  I have advised repeat screening in 3 years  Questions answered, placed in recall

## 2019-09-30 DIAGNOSIS — K21.9 GASTROESOPHAGEAL REFLUX DISEASE WITHOUT ESOPHAGITIS: ICD-10-CM

## 2019-09-30 RX ORDER — OMEPRAZOLE 20 MG/1
20 CAPSULE, DELAYED RELEASE ORAL EVERY MORNING
Qty: 90 CAPSULE | Refills: 0 | Status: SHIPPED | OUTPATIENT
Start: 2019-09-30 | End: 2020-01-16

## 2019-10-22 ENCOUNTER — APPOINTMENT (OUTPATIENT)
Dept: LAB | Facility: CLINIC | Age: 69
End: 2019-10-22
Payer: MEDICARE

## 2019-10-22 ENCOUNTER — TRANSCRIBE ORDERS (OUTPATIENT)
Dept: ADMINISTRATIVE | Facility: HOSPITAL | Age: 69
End: 2019-10-22

## 2019-10-22 ENCOUNTER — APPOINTMENT (OUTPATIENT)
Dept: RADIOLOGY | Facility: CLINIC | Age: 69
End: 2019-10-22
Payer: MEDICARE

## 2019-10-22 DIAGNOSIS — Z85.828 PERSONAL HISTORY OF SQUAMOUS CELL CARCINOMA OF SKIN: ICD-10-CM

## 2019-10-22 DIAGNOSIS — K86.2 CYST OF PANCREAS: ICD-10-CM

## 2019-10-22 DIAGNOSIS — D64.89 ANEMIA DUE TO MULTIPLE MECHANISMS: ICD-10-CM

## 2019-10-22 DIAGNOSIS — C62.11 MALIGNANT NEOPLASM OF DESCENDED RIGHT TESTIS (HCC): ICD-10-CM

## 2019-10-22 DIAGNOSIS — R97.8 ABNORMAL TUMOR MARKERS: Primary | ICD-10-CM

## 2019-10-22 DIAGNOSIS — R97.8 ABNORMAL TUMOR MARKERS: ICD-10-CM

## 2019-10-22 DIAGNOSIS — Z85.828 HISTORY OF SQUAMOUS CELL CARCINOMA OF SKIN: ICD-10-CM

## 2019-10-22 LAB
AFP-TM SERPL-MCNC: 5.6 NG/ML (ref 0.5–8)
ALBUMIN SERPL BCP-MCNC: 3.7 G/DL (ref 3.5–5)
ALP SERPL-CCNC: 44 U/L (ref 46–116)
ALT SERPL W P-5'-P-CCNC: 26 U/L (ref 12–78)
ANION GAP SERPL CALCULATED.3IONS-SCNC: 7 MMOL/L (ref 4–13)
AST SERPL W P-5'-P-CCNC: 18 U/L (ref 5–45)
B-HCG SERPL-ACNC: <2 MIU/ML
BASOPHILS # BLD AUTO: 0.07 THOUSANDS/ΜL (ref 0–0.1)
BASOPHILS NFR BLD AUTO: 1 % (ref 0–1)
BILIRUB SERPL-MCNC: 0.56 MG/DL (ref 0.2–1)
BUN SERPL-MCNC: 15 MG/DL (ref 5–25)
CALCIUM SERPL-MCNC: 9 MG/DL (ref 8.3–10.1)
CHLORIDE SERPL-SCNC: 99 MMOL/L (ref 100–108)
CO2 SERPL-SCNC: 25 MMOL/L (ref 21–32)
CREAT SERPL-MCNC: 1 MG/DL (ref 0.6–1.3)
EOSINOPHIL # BLD AUTO: 0.14 THOUSAND/ΜL (ref 0–0.61)
EOSINOPHIL NFR BLD AUTO: 3 % (ref 0–6)
ERYTHROCYTE [DISTWIDTH] IN BLOOD BY AUTOMATED COUNT: 12.2 % (ref 11.6–15.1)
GFR SERPL CREATININE-BSD FRML MDRD: 76 ML/MIN/1.73SQ M
GLUCOSE P FAST SERPL-MCNC: 83 MG/DL (ref 65–99)
HCT VFR BLD AUTO: 35.5 % (ref 36.5–49.3)
HGB BLD-MCNC: 11.8 G/DL (ref 12–17)
IMM GRANULOCYTES # BLD AUTO: 0.01 THOUSAND/UL (ref 0–0.2)
IMM GRANULOCYTES NFR BLD AUTO: 0 % (ref 0–2)
LDH SERPL-CCNC: 222 U/L (ref 81–234)
LYMPHOCYTES # BLD AUTO: 1.44 THOUSANDS/ΜL (ref 0.6–4.47)
LYMPHOCYTES NFR BLD AUTO: 26 % (ref 14–44)
MCH RBC QN AUTO: 32.9 PG (ref 26.8–34.3)
MCHC RBC AUTO-ENTMCNC: 33.2 G/DL (ref 31.4–37.4)
MCV RBC AUTO: 99 FL (ref 82–98)
MONOCYTES # BLD AUTO: 0.71 THOUSAND/ΜL (ref 0.17–1.22)
MONOCYTES NFR BLD AUTO: 13 % (ref 4–12)
NEUTROPHILS # BLD AUTO: 3.09 THOUSANDS/ΜL (ref 1.85–7.62)
NEUTS SEG NFR BLD AUTO: 57 % (ref 43–75)
NRBC BLD AUTO-RTO: 0 /100 WBCS
PLATELET # BLD AUTO: 332 THOUSANDS/UL (ref 149–390)
PMV BLD AUTO: 9.3 FL (ref 8.9–12.7)
POTASSIUM SERPL-SCNC: 4.1 MMOL/L (ref 3.5–5.3)
PROT SERPL-MCNC: 6.7 G/DL (ref 6.4–8.2)
RBC # BLD AUTO: 3.59 MILLION/UL (ref 3.88–5.62)
SODIUM SERPL-SCNC: 131 MMOL/L (ref 136–145)
WBC # BLD AUTO: 5.46 THOUSAND/UL (ref 4.31–10.16)

## 2019-10-22 PROCEDURE — 85025 COMPLETE CBC W/AUTO DIFF WBC: CPT

## 2019-10-22 PROCEDURE — 80053 COMPREHEN METABOLIC PANEL: CPT

## 2019-10-22 PROCEDURE — 36415 COLL VENOUS BLD VENIPUNCTURE: CPT

## 2019-10-22 PROCEDURE — 84702 CHORIONIC GONADOTROPIN TEST: CPT

## 2019-10-22 PROCEDURE — 71046 X-RAY EXAM CHEST 2 VIEWS: CPT

## 2019-10-22 PROCEDURE — 83615 LACTATE (LD) (LDH) ENZYME: CPT

## 2019-10-22 PROCEDURE — 82105 ALPHA-FETOPROTEIN SERUM: CPT

## 2020-01-16 DIAGNOSIS — K21.9 GASTROESOPHAGEAL REFLUX DISEASE WITHOUT ESOPHAGITIS: ICD-10-CM

## 2020-01-16 RX ORDER — OMEPRAZOLE 20 MG/1
CAPSULE, DELAYED RELEASE ORAL
Qty: 90 CAPSULE | Refills: 0 | Status: SHIPPED | OUTPATIENT
Start: 2020-01-16 | End: 2020-04-13

## 2020-01-25 ENCOUNTER — APPOINTMENT (OUTPATIENT)
Dept: RADIOLOGY | Facility: CLINIC | Age: 70
End: 2020-01-25
Payer: MEDICARE

## 2020-01-25 ENCOUNTER — OFFICE VISIT (OUTPATIENT)
Dept: URGENT CARE | Facility: CLINIC | Age: 70
End: 2020-01-25
Payer: MEDICARE

## 2020-01-25 VITALS
BODY MASS INDEX: 31.83 KG/M2 | TEMPERATURE: 99.7 F | OXYGEN SATURATION: 97 % | RESPIRATION RATE: 18 BRPM | SYSTOLIC BLOOD PRESSURE: 156 MMHG | WEIGHT: 210 LBS | HEIGHT: 68 IN | HEART RATE: 73 BPM | DIASTOLIC BLOOD PRESSURE: 80 MMHG

## 2020-01-25 DIAGNOSIS — R50.9 FEVER, UNSPECIFIED FEVER CAUSE: ICD-10-CM

## 2020-01-25 DIAGNOSIS — R05.9 COUGH: ICD-10-CM

## 2020-01-25 DIAGNOSIS — R50.9 FEVER, UNSPECIFIED FEVER CAUSE: Primary | ICD-10-CM

## 2020-01-25 PROCEDURE — G0463 HOSPITAL OUTPT CLINIC VISIT: HCPCS | Performed by: PHYSICIAN ASSISTANT

## 2020-01-25 PROCEDURE — 99213 OFFICE O/P EST LOW 20 MIN: CPT | Performed by: PHYSICIAN ASSISTANT

## 2020-01-25 PROCEDURE — 71046 X-RAY EXAM CHEST 2 VIEWS: CPT

## 2020-01-25 RX ORDER — OSELTAMIVIR PHOSPHATE 75 MG/1
75 CAPSULE ORAL 2 TIMES DAILY
Qty: 10 CAPSULE | Refills: 0 | Status: SHIPPED | OUTPATIENT
Start: 2020-01-25 | End: 2020-01-30

## 2020-01-25 NOTE — PATIENT INSTRUCTIONS
Chest x-ray is clear on my read  Likely flu with fever and body aches  We will start on Tamiflu  Continue Tylenol and Motrin  He can use Mucinex over-the-counter for the cough  Follow-up with PCP in 2-3 days  If symptoms are worse go to the ER

## 2020-01-25 NOTE — PROGRESS NOTES
[facility]        NAME: Roxie Vinson is a 78 y o  male  : 1950    MRN: [id number]  DATE: 2020  TIME: 9:18 AM    Assessment and Plan   Fever, unspecified fever cause [R50 9]  1  Fever, unspecified fever cause  XR chest pa & lateral    oseltamivir (TAMIFLU) 75 mg capsule   2  Cough  oseltamivir (TAMIFLU) 75 mg capsule         Patient Instructions     Patient Instructions     Chest x-ray is clear on my read  Likely flu with fever and body aches  We will start on Tamiflu  Continue Tylenol and Motrin  He can use Mucinex over-the-counter for the cough  Follow-up with PCP in 2-3 days  If symptoms are worse go to the ER  Follow up with PCP in 3-5 days  Proceed to  ER if symptoms worsen  Chief Complaint     Chief Complaint   Patient presents with    Generalized Body Aches     started last night, states his legs hurt the most  Had a low grade 99 9 temp this AM and has an occasional cough         History of Present Illness       78year-old male complains of 2 days of cough and fever  No chest pain or shortness of breath  He has body aches  No flu shot this year  He has a history of lung cancer and right middle lobectomy  No vomiting  He took ibuprofen  He has body aches and his legs      Review of Systems   Review of Systems   Constitutional: Positive for chills, fatigue and fever  HENT: Positive for congestion and rhinorrhea  Negative for ear pain, postnasal drip, sinus pressure, sinus pain and sore throat  Eyes: Negative for pain, redness and visual disturbance  Respiratory: Positive for cough  Negative for shortness of breath and wheezing  Cardiovascular: Negative for chest pain and palpitations  Gastrointestinal: Negative for abdominal pain, diarrhea, nausea and vomiting  Musculoskeletal: Positive for myalgias  Neurological: Negative for dizziness and headaches           Current Medications       Current Outpatient Medications:     LOPEZ PO, Take 1 capsule by mouth every morning, Disp: , Rfl:     omeprazole (PriLOSEC) 20 mg delayed release capsule, TAKE ONE CAPSULE BY MOUTH EVERY DAY IN THE MORNING  , Disp: 90 capsule, Rfl: 0    oseltamivir (TAMIFLU) 75 mg capsule, Take 1 capsule (75 mg total) by mouth 2 (two) times a day for 5 days, Disp: 10 capsule, Rfl: 0    Current Allergies     Allergies as of 01/25/2020    (No Known Allergies)            The following portions of the patient's history were reviewed and updated as appropriate: allergies, current medications, past family history, past medical history, past social history, past surgical history and problem list      Past Medical History:   Diagnosis Date    Arthritis     generalized    Roque's esophagus without dysplasia 6/19/2019    Cancer [facility])     testicular cancer and lung 2012  (had chemo 2012 and clear since)    Chronic pain disorder     herniated disc in lumbar area; monthly visit to chiropractor    Diverticulosis 6/19/2019    Gastroesophageal reflux disease without esophagitis 2/25/2019    Gout     History of colon polyps 6/19/2019    Kidney stone 2003? no problem since then       Past Surgical History:   Procedure Laterality Date    COLONOSCOPY  2016    CYSTOSCOPY W/ LASER LITHOTRIPSY  2003?  EGD  2015    gabriele    HERNIA REPAIR Left     x3 surgery    LUNG REMOVAL, PARTIAL Right 2012    right middle    SKIN BIOPSY      TESTICLE SURGERY Right 2010    removal       Family History   Problem Relation Age of Onset    Cancer Mother     Cancer Father     Thyroid cancer Brother     Tongue cancer Brother          Medications have been verified  Objective   /80   Pulse 73   Temp 99 7 °F (37 6 °C) (Oral)   Resp 18   Ht 5' 8" (1 727 m)   Wt 95 3 kg (210 lb)   SpO2 97%   BMI 31 93 kg/m²        Physical Exam     Physical Exam   Constitutional: He is oriented to person, place, and time  He appears well-developed and well-nourished  No distress     HENT:   Head: Normocephalic and atraumatic  Right Ear: Tympanic membrane, external ear and ear canal normal  Tympanic membrane is not erythematous, not retracted and not bulging  No middle ear effusion  Left Ear: Tympanic membrane, external ear and ear canal normal  Tympanic membrane is not erythematous, not retracted and not bulging  No middle ear effusion  Nose: Nose normal  No mucosal edema or rhinorrhea  Right sinus exhibits no maxillary sinus tenderness and no frontal sinus tenderness  Left sinus exhibits no maxillary sinus tenderness and no frontal sinus tenderness  Mouth/Throat: Oropharynx is clear and moist and mucous membranes are normal  No posterior oropharyngeal erythema  Eyes: Pupils are equal, round, and reactive to light  Conjunctivae and EOM are normal  Right eye exhibits no chemosis and no discharge  Left eye exhibits no chemosis and no discharge  Right conjunctiva is not injected  Left conjunctiva is not injected  Neck: Normal range of motion  Neck supple  Cardiovascular: Normal rate, regular rhythm and normal heart sounds  Pulmonary/Chest: Effort normal  No respiratory distress  He has decreased breath sounds in the right lower field  He has no wheezes  He has rhonchi (cleared with deep breath  cough) in the left lower field  He has no rales  Lymphadenopathy:     He has no cervical adenopathy  Right cervical: No superficial cervical adenopathy present  Left cervical: No superficial cervical adenopathy present  Neurological: He is alert and oriented to person, place, and time  No cranial nerve deficit  Skin: Skin is warm and dry  No rash noted

## 2020-04-13 DIAGNOSIS — K21.9 GASTROESOPHAGEAL REFLUX DISEASE WITHOUT ESOPHAGITIS: ICD-10-CM

## 2020-04-13 RX ORDER — OMEPRAZOLE 20 MG/1
CAPSULE, DELAYED RELEASE ORAL
Qty: 90 CAPSULE | Refills: 0 | Status: SHIPPED | OUTPATIENT
Start: 2020-04-13 | End: 2020-06-08

## 2020-04-27 ENCOUNTER — APPOINTMENT (OUTPATIENT)
Dept: RADIOLOGY | Facility: CLINIC | Age: 70
End: 2020-04-27
Payer: MEDICARE

## 2020-04-27 ENCOUNTER — TRANSCRIBE ORDERS (OUTPATIENT)
Dept: ADMINISTRATIVE | Facility: HOSPITAL | Age: 70
End: 2020-04-27

## 2020-04-27 DIAGNOSIS — R97.8 ABNORMAL TUMOR MARKERS: Primary | ICD-10-CM

## 2020-04-27 DIAGNOSIS — R97.8 ABNORMAL TUMOR MARKERS: ICD-10-CM

## 2020-04-27 PROCEDURE — 71046 X-RAY EXAM CHEST 2 VIEWS: CPT

## 2020-06-08 DIAGNOSIS — K21.9 GASTROESOPHAGEAL REFLUX DISEASE WITHOUT ESOPHAGITIS: ICD-10-CM

## 2020-06-08 RX ORDER — OMEPRAZOLE 20 MG/1
CAPSULE, DELAYED RELEASE ORAL
Qty: 90 CAPSULE | Refills: 0 | Status: SHIPPED | OUTPATIENT
Start: 2020-06-08 | End: 2020-09-16 | Stop reason: SDUPTHER

## 2020-08-06 ENCOUNTER — TELEPHONE (OUTPATIENT)
Dept: SURGERY | Facility: CLINIC | Age: 70
End: 2020-08-06

## 2020-08-06 NOTE — TELEPHONE ENCOUNTER
Left message for the patient to call our office to schedule an appointment for pre op EGD  I stated that I had him in our recall for a one year follow EGD

## 2020-08-21 ENCOUNTER — PROCEDURE VISIT (OUTPATIENT)
Dept: SURGERY | Facility: CLINIC | Age: 70
End: 2020-08-21
Payer: MEDICARE

## 2020-08-21 VITALS
BODY MASS INDEX: 31.37 KG/M2 | SYSTOLIC BLOOD PRESSURE: 142 MMHG | DIASTOLIC BLOOD PRESSURE: 74 MMHG | HEIGHT: 68 IN | RESPIRATION RATE: 18 BRPM | HEART RATE: 68 BPM | WEIGHT: 207 LBS | TEMPERATURE: 97.3 F

## 2020-08-21 DIAGNOSIS — K22.70 BARRETT'S ESOPHAGUS WITHOUT DYSPLASIA: ICD-10-CM

## 2020-08-21 DIAGNOSIS — K57.30 DIVERTICULAR DISEASE OF LARGE INTESTINE: ICD-10-CM

## 2020-08-21 DIAGNOSIS — D12.0 ADENOMATOUS POLYP OF CECUM: Primary | ICD-10-CM

## 2020-08-21 PROCEDURE — 99214 OFFICE O/P EST MOD 30 MIN: CPT | Performed by: SURGERY

## 2020-08-21 RX ORDER — SODIUM, POTASSIUM,MAG SULFATES 17.5-3.13G
1 SOLUTION, RECONSTITUTED, ORAL ORAL ONCE
Qty: 1 BOTTLE | Refills: 0 | Status: SHIPPED | OUTPATIENT
Start: 2020-08-21 | End: 2020-08-25

## 2020-08-21 NOTE — H&P
Assessment/Plan:    Adenomatous polyp of cecum  The patient is a pleasant 50-year-old male with a history of a tubular adenoma of the cecum removed piecemeal 1 year ago for which repeat colonoscopy is now indicated to exclude recurrent disease  Today on physical examination the patient is well appearing  Awake alert oriented  Competent reliable as historian  He specifically denies all complaints referable to his colon  His abdomen is soft and nontender to percussion and palpation in 4 quadrants  The technical details of colonoscopy were reviewed as were the risks related to anesthesia, bleeding and gastrointestinal tract injury necessitating emergent surgical intervention  All questions answered to the satisfaction of the patient and informed consent taken to proceed  Roque's esophagus without dysplasia  Patient has a history of Roque's esophagus without high-grade dysplasia for which repeat EGD is indicated to exclude progression of disease  The technical details of upper endoscopy were reviewed as were the risks related to anesthesia bleeding and GI tract injury necessitating surgical interventions  All questions answered and informed consent taken to proceed  Diverticular disease of large intestine  Patient has a history of extensive diverticular disease for which repeat colonoscopy is now indicated  Subjective:      Patient ID: Leslie Salazar is a 69 y o  male  Patient is here today for pre op EGD and Colonoscopy  Stated that is doing well and is here today for his pre op EGD/Colonoscopy that he was in recall for  Ami Brunner Ma      Review of Systems   Constitutional: Negative  HENT: Negative  Eyes: Negative  Respiratory: Negative  Cardiovascular: Negative  Gastrointestinal: Negative  Endocrine: Negative  Genitourinary: Negative  Musculoskeletal: Negative  Skin: Negative  Allergic/Immunologic: Negative  Neurological: Negative  Hematological: Negative  Psychiatric/Behavioral: Negative  Objective:      /74 (BP Location: Left arm, Patient Position: Sitting, Cuff Size: Standard)   Pulse 68   Temp (!) 97 3 °F (36 3 °C) (Temporal)   Resp 18   Ht 5' 8" (1 727 m)   Wt 93 9 kg (207 lb)   BMI 31 47 kg/m²          Physical Exam  Vitals signs and nursing note reviewed  Constitutional:       Appearance: He is well-developed  HENT:      Head: Normocephalic and atraumatic  Eyes:      Conjunctiva/sclera: Conjunctivae normal       Pupils: Pupils are equal, round, and reactive to light  Neck:      Musculoskeletal: Normal range of motion and neck supple  Cardiovascular:      Rate and Rhythm: Normal rate and regular rhythm  Pulmonary:      Effort: Pulmonary effort is normal       Breath sounds: Normal breath sounds  Abdominal:      General: Bowel sounds are normal       Palpations: Abdomen is soft  Musculoskeletal: Normal range of motion  Skin:     General: Skin is warm and dry  Neurological:      Mental Status: He is alert and oriented to person, place, and time  Psychiatric:         Behavior: Behavior normal          Thought Content:  Thought content normal          Judgment: Judgment normal

## 2020-08-21 NOTE — ASSESSMENT & PLAN NOTE
Patient has a history of Roque's esophagus without high-grade dysplasia for which repeat EGD is indicated to exclude progression of disease  The technical details of upper endoscopy were reviewed as were the risks related to anesthesia bleeding and GI tract injury necessitating surgical interventions  All questions answered and informed consent taken to proceed

## 2020-08-21 NOTE — ASSESSMENT & PLAN NOTE
Patient has a history of extensive diverticular disease for which repeat colonoscopy is now indicated

## 2020-08-21 NOTE — PROGRESS NOTES
Assessment/Plan:    Adenomatous polyp of cecum  The patient is a pleasant 71-year-old male with a history of a tubular adenoma of the cecum removed piecemeal 1 year ago for which repeat colonoscopy is now indicated to exclude recurrent disease  Today on physical examination the patient is well appearing  Awake alert oriented  Competent reliable as historian  He specifically denies all complaints referable to his colon  His abdomen is soft and nontender to percussion and palpation in 4 quadrants  The technical details of colonoscopy were reviewed as were the risks related to anesthesia, bleeding and gastrointestinal tract injury necessitating emergent surgical intervention  All questions answered to the satisfaction of the patient and informed consent taken to proceed  Roque's esophagus without dysplasia  Patient has a history of Roque's esophagus without high-grade dysplasia for which repeat EGD is indicated to exclude progression of disease  The technical details of upper endoscopy were reviewed as were the risks related to anesthesia bleeding and GI tract injury necessitating surgical interventions  All questions answered and informed consent taken to proceed  Diverticular disease of large intestine  Patient has a history of extensive diverticular disease for which repeat colonoscopy is now indicated  Subjective:      Patient ID: Consuelo Gudino is a 69 y o  male  Patient is here today for pre op EGD and Colonoscopy  Stated that is doing well and is here today for his pre op EGD/Colonoscopy that he was in recall for  Ami Brunner Ma      Review of Systems   Constitutional: Negative  HENT: Negative  Eyes: Negative  Respiratory: Negative  Cardiovascular: Negative  Gastrointestinal: Negative  Endocrine: Negative  Genitourinary: Negative  Musculoskeletal: Negative  Skin: Negative  Allergic/Immunologic: Negative  Neurological: Negative  Hematological: Negative  Psychiatric/Behavioral: Negative  Objective:      /74 (BP Location: Left arm, Patient Position: Sitting, Cuff Size: Standard)   Pulse 68   Temp (!) 97 3 °F (36 3 °C) (Temporal)   Resp 18   Ht 5' 8" (1 727 m)   Wt 93 9 kg (207 lb)   BMI 31 47 kg/m²          Physical Exam  Vitals signs and nursing note reviewed  Constitutional:       Appearance: He is well-developed  HENT:      Head: Normocephalic and atraumatic  Eyes:      Conjunctiva/sclera: Conjunctivae normal       Pupils: Pupils are equal, round, and reactive to light  Neck:      Musculoskeletal: Normal range of motion and neck supple  Cardiovascular:      Rate and Rhythm: Normal rate and regular rhythm  Pulmonary:      Effort: Pulmonary effort is normal       Breath sounds: Normal breath sounds  Abdominal:      General: Bowel sounds are normal       Palpations: Abdomen is soft  Musculoskeletal: Normal range of motion  Skin:     General: Skin is warm and dry  Neurological:      Mental Status: He is alert and oriented to person, place, and time  Psychiatric:         Behavior: Behavior normal          Thought Content:  Thought content normal          Judgment: Judgment normal

## 2020-08-21 NOTE — LETTER
August 21, 2020     Nivia Sheffield DO  [address]  [facility] [address]    Patient: Rita Mott   YOB: 1950   Date of Visit: 8/21/2020       Dear Dr Betzy Chen: Thank you for referring Charlie Kilgore to me for evaluation  Below are my notes for this consultation  If you have questions, please do not hesitate to call me  I look forward to following your patient along with you  Sincerely,        Rosario Saucedo MD        CC: No Recipients  Rosario Saucedo MD  8/21/2020 10:07 AM  Sign when Signing Visit  Assessment/Plan:    Adenomatous polyp of cecum  The patient is a pleasant 79-year-old male with a history of a tubular adenoma of the cecum removed piecemeal 1 year ago for which repeat colonoscopy is now indicated to exclude recurrent disease  Today on physical examination the patient is well appearing  Awake alert oriented  Competent reliable as historian  He specifically denies all complaints referable to his colon  His abdomen is soft and nontender to percussion and palpation in 4 quadrants  The technical details of colonoscopy were reviewed as were the risks related to anesthesia, bleeding and gastrointestinal tract injury necessitating emergent surgical intervention  All questions answered to the satisfaction of the patient and informed consent taken to proceed  Roque's esophagus without dysplasia  Patient has a history of Roque's esophagus without high-grade dysplasia for which repeat EGD is indicated to exclude progression of disease  The technical details of upper endoscopy were reviewed as were the risks related to anesthesia bleeding and GI tract injury necessitating surgical interventions  All questions answered and informed consent taken to proceed  Diverticular disease of large intestine  Patient has a history of extensive diverticular disease for which repeat colonoscopy is now indicated              Subjective:      Patient ID: Caryn Pino Amy Hester is a 69 y o  male  Patient is here today for pre op EGD and Colonoscopy  Stated that is doing well and is here today for his pre op EGD/Colonoscopy that he was in recall for  Ami Brunner Ma      Review of Systems   Constitutional: Negative  HENT: Negative  Eyes: Negative  Respiratory: Negative  Cardiovascular: Negative  Gastrointestinal: Negative  Endocrine: Negative  Genitourinary: Negative  Musculoskeletal: Negative  Skin: Negative  Allergic/Immunologic: Negative  Neurological: Negative  Hematological: Negative  Psychiatric/Behavioral: Negative  Objective:      /74 (BP Location: Left arm, Patient Position: Sitting, Cuff Size: Standard)   Pulse 68   Temp (!) 97 3 °F (36 3 °C) (Temporal)   Resp 18   Ht 5' 8" (1 727 m)   Wt 93 9 kg (207 lb)   BMI 31 47 kg/m²          Physical Exam  Vitals signs and nursing note reviewed  Constitutional:       Appearance: He is well-developed  HENT:      Head: Normocephalic and atraumatic  Eyes:      Conjunctiva/sclera: Conjunctivae normal       Pupils: Pupils are equal, round, and reactive to light  Neck:      Musculoskeletal: Normal range of motion and neck supple  Cardiovascular:      Rate and Rhythm: Normal rate and regular rhythm  Pulmonary:      Effort: Pulmonary effort is normal       Breath sounds: Normal breath sounds  Abdominal:      General: Bowel sounds are normal       Palpations: Abdomen is soft  Musculoskeletal: Normal range of motion  Skin:     General: Skin is warm and dry  Neurological:      Mental Status: He is alert and oriented to person, place, and time  Psychiatric:         Behavior: Behavior normal          Thought Content:  Thought content normal          Judgment: Judgment normal

## 2020-08-21 NOTE — H&P (VIEW-ONLY)
Assessment/Plan:    Adenomatous polyp of cecum  The patient is a pleasant 65-year-old male with a history of a tubular adenoma of the cecum removed piecemeal 1 year ago for which repeat colonoscopy is now indicated to exclude recurrent disease  Today on physical examination the patient is well appearing  Awake alert oriented  Competent reliable as historian  He specifically denies all complaints referable to his colon  His abdomen is soft and nontender to percussion and palpation in 4 quadrants  The technical details of colonoscopy were reviewed as were the risks related to anesthesia, bleeding and gastrointestinal tract injury necessitating emergent surgical intervention  All questions answered to the satisfaction of the patient and informed consent taken to proceed  Roque's esophagus without dysplasia  Patient has a history of Roque's esophagus without high-grade dysplasia for which repeat EGD is indicated to exclude progression of disease  The technical details of upper endoscopy were reviewed as were the risks related to anesthesia bleeding and GI tract injury necessitating surgical interventions  All questions answered and informed consent taken to proceed  Diverticular disease of large intestine  Patient has a history of extensive diverticular disease for which repeat colonoscopy is now indicated  Subjective:      Patient ID: Jasmin Doan is a 69 y o  male  Patient is here today for pre op EGD and Colonoscopy  Stated that is doing well and is here today for his pre op EGD/Colonoscopy that he was in recall for  Ami Brunner Ma      Review of Systems   Constitutional: Negative  HENT: Negative  Eyes: Negative  Respiratory: Negative  Cardiovascular: Negative  Gastrointestinal: Negative  Endocrine: Negative  Genitourinary: Negative  Musculoskeletal: Negative  Skin: Negative  Allergic/Immunologic: Negative  Neurological: Negative  Hematological: Negative  Psychiatric/Behavioral: Negative  Objective:      /74 (BP Location: Left arm, Patient Position: Sitting, Cuff Size: Standard)   Pulse 68   Temp (!) 97 3 °F (36 3 °C) (Temporal)   Resp 18   Ht 5' 8" (1 727 m)   Wt 93 9 kg (207 lb)   BMI 31 47 kg/m²          Physical Exam  Vitals signs and nursing note reviewed  Constitutional:       Appearance: He is well-developed  HENT:      Head: Normocephalic and atraumatic  Eyes:      Conjunctiva/sclera: Conjunctivae normal       Pupils: Pupils are equal, round, and reactive to light  Neck:      Musculoskeletal: Normal range of motion and neck supple  Cardiovascular:      Rate and Rhythm: Normal rate and regular rhythm  Pulmonary:      Effort: Pulmonary effort is normal       Breath sounds: Normal breath sounds  Abdominal:      General: Bowel sounds are normal       Palpations: Abdomen is soft  Musculoskeletal: Normal range of motion  Skin:     General: Skin is warm and dry  Neurological:      Mental Status: He is alert and oriented to person, place, and time  Psychiatric:         Behavior: Behavior normal          Thought Content:  Thought content normal          Judgment: Judgment normal

## 2020-08-21 NOTE — ASSESSMENT & PLAN NOTE
The patient is a pleasant 18-year-old male with a history of a tubular adenoma of the cecum removed piecemeal 1 year ago for which repeat colonoscopy is now indicated to exclude recurrent disease  Today on physical examination the patient is well appearing  Awake alert oriented  Competent reliable as historian  He specifically denies all complaints referable to his colon  His abdomen is soft and nontender to percussion and palpation in 4 quadrants  The technical details of colonoscopy were reviewed as were the risks related to anesthesia, bleeding and gastrointestinal tract injury necessitating emergent surgical intervention  All questions answered to the satisfaction of the patient and informed consent taken to proceed

## 2020-08-25 NOTE — PRE-PROCEDURE INSTRUCTIONS
Pre-Surgery Instructions:   Medication Instructions    JOHN TRIVEDI Instructed patient per Anesthesia Guidelines  LD 8/31    omeprazole (PriLOSEC) 20 mg delayed release capsule Patient was instructed by Physician and understands  LD 8/31    Suprep Bowel Prep Kit 17 5-3 13-1 6 GM/177ML SOLN Patient was instructed by Physician and understands  LD 8/31

## 2020-09-01 ENCOUNTER — ANESTHESIA (OUTPATIENT)
Dept: PERIOP | Facility: HOSPITAL | Age: 70
End: 2020-09-01

## 2020-09-01 ENCOUNTER — ANESTHESIA EVENT (OUTPATIENT)
Dept: PERIOP | Facility: HOSPITAL | Age: 70
End: 2020-09-01

## 2020-09-01 ENCOUNTER — HOSPITAL ENCOUNTER (OUTPATIENT)
Dept: PERIOP | Facility: HOSPITAL | Age: 70
Setting detail: OUTPATIENT SURGERY
Discharge: HOME | End: 2020-09-01
Attending: SURGERY | Admitting: SURGERY
Payer: MEDICARE

## 2020-09-01 VITALS
HEIGHT: 68 IN | TEMPERATURE: 96.9 F | RESPIRATION RATE: 16 BRPM | SYSTOLIC BLOOD PRESSURE: 185 MMHG | DIASTOLIC BLOOD PRESSURE: 89 MMHG | BODY MASS INDEX: 30.31 KG/M2 | OXYGEN SATURATION: 99 % | WEIGHT: 200 LBS | HEART RATE: 67 BPM

## 2020-09-01 DIAGNOSIS — K22.70 BARRETT'S ESOPHAGUS WITHOUT DYSPLASIA: ICD-10-CM

## 2020-09-01 DIAGNOSIS — D12.0 ADENOMATOUS POLYP OF CECUM: ICD-10-CM

## 2020-09-01 DIAGNOSIS — K57.30 DIVERTICULAR DISEASE OF LARGE INTESTINE: ICD-10-CM

## 2020-09-01 PROBLEM — K63.5 POLYP OF HEPATIC FLEXURE OF COLON: Status: ACTIVE | Noted: 2020-09-01

## 2020-09-01 PROBLEM — K63.5 HYPERPLASTIC POLYP OF TRANSVERSE COLON: Status: ACTIVE | Noted: 2020-09-01

## 2020-09-01 PROCEDURE — 45384 COLONOSCOPY W/LESION REMOVAL: CPT | Performed by: SURGERY

## 2020-09-01 PROCEDURE — 88305 TISSUE EXAM BY PATHOLOGIST: CPT | Performed by: PATHOLOGY

## 2020-09-01 PROCEDURE — 45385 COLONOSCOPY W/LESION REMOVAL: CPT | Performed by: SURGERY

## 2020-09-01 PROCEDURE — 43239 EGD BIOPSY SINGLE/MULTIPLE: CPT | Performed by: SURGERY

## 2020-09-01 RX ORDER — EPHEDRINE SULFATE 50 MG/ML
INJECTION INTRAVENOUS AS NEEDED
Status: DISCONTINUED | OUTPATIENT
Start: 2020-09-01 | End: 2020-09-01

## 2020-09-01 RX ORDER — SODIUM CHLORIDE, SODIUM LACTATE, POTASSIUM CHLORIDE, CALCIUM CHLORIDE 600; 310; 30; 20 MG/100ML; MG/100ML; MG/100ML; MG/100ML
125 INJECTION, SOLUTION INTRAVENOUS CONTINUOUS
Status: DISCONTINUED | OUTPATIENT
Start: 2020-09-01 | End: 2020-09-05 | Stop reason: HOSPADM

## 2020-09-01 RX ORDER — PROPOFOL 10 MG/ML
INJECTION, EMULSION INTRAVENOUS AS NEEDED
Status: DISCONTINUED | OUTPATIENT
Start: 2020-09-01 | End: 2020-09-01

## 2020-09-01 RX ADMIN — EPHEDRINE SULFATE 10 MG: 50 INJECTION, SOLUTION INTRAVENOUS at 08:28

## 2020-09-01 RX ADMIN — PROPOFOL 120 MG: 10 INJECTION, EMULSION INTRAVENOUS at 08:06

## 2020-09-01 RX ADMIN — PROPOFOL 30 MG: 10 INJECTION, EMULSION INTRAVENOUS at 08:42

## 2020-09-01 RX ADMIN — PROPOFOL 30 MG: 10 INJECTION, EMULSION INTRAVENOUS at 08:16

## 2020-09-01 RX ADMIN — PROPOFOL 30 MG: 10 INJECTION, EMULSION INTRAVENOUS at 08:38

## 2020-09-01 RX ADMIN — PROPOFOL 30 MG: 10 INJECTION, EMULSION INTRAVENOUS at 08:10

## 2020-09-01 RX ADMIN — PROPOFOL 30 MG: 10 INJECTION, EMULSION INTRAVENOUS at 08:19

## 2020-09-01 RX ADMIN — PROPOFOL 30 MG: 10 INJECTION, EMULSION INTRAVENOUS at 08:35

## 2020-09-01 RX ADMIN — PROPOFOL 30 MG: 10 INJECTION, EMULSION INTRAVENOUS at 08:22

## 2020-09-01 RX ADMIN — PROPOFOL 30 MG: 10 INJECTION, EMULSION INTRAVENOUS at 08:32

## 2020-09-01 RX ADMIN — PROPOFOL 30 MG: 10 INJECTION, EMULSION INTRAVENOUS at 08:13

## 2020-09-01 RX ADMIN — PROPOFOL 30 MG: 10 INJECTION, EMULSION INTRAVENOUS at 08:25

## 2020-09-01 RX ADMIN — SODIUM CHLORIDE, SODIUM LACTATE, POTASSIUM CHLORIDE, AND CALCIUM CHLORIDE 125 ML/HR: .6; .31; .03; .02 INJECTION, SOLUTION INTRAVENOUS at 07:28

## 2020-09-01 RX ADMIN — PROPOFOL 30 MG: 10 INJECTION, EMULSION INTRAVENOUS at 08:28

## 2020-09-01 NOTE — ANESTHESIA POSTPROCEDURE EVALUATION
Post-Op Assessment Note    CV Status:  Stable  Pain Score: 0    Pain management: adequate     Mental Status:  Alert and awake   Hydration Status:  Euvolemic   PONV Controlled:  Controlled   Airway Patency:  Patent      Post Op Vitals Reviewed: Yes      Staff: CRNA         No complications documented      BP  135/75   Temp   97 0   Pulse  81   Resp   18   SpO2   95

## 2020-09-01 NOTE — INTERVAL H&P NOTE
H&P reviewed  After examining the patient I find no changes in the patients condition since the H&P had been written      Vitals:    09/01/20 0718   BP: (!) 182/90   Pulse: 60   Resp: 18   Temp: 97 7 °F (36 5 °C)   SpO2: 97%

## 2020-09-01 NOTE — ANESTHESIA PREPROCEDURE EVALUATION
Procedure:  COLONOSCOPY  EGD    Relevant Problems   GI/HEPATIC   (+) Cyst of pancreas   (+) Gastroesophageal reflux disease without esophagitis      MUSCULOSKELETAL   (+) Gout      NEURO/PSYCH   (+) History of colon polyps   (+) History of squamous cell carcinoma of skin        Physical Exam    Airway    Mallampati score: II  TM Distance: >3 FB  Neck ROM: full     Dental   No notable dental hx     Cardiovascular      Pulmonary      Other Findings        Anesthesia Plan  ASA Score- 2     Anesthesia Type- IV sedation with anesthesia with ASA Monitors  Additional Monitors:   Airway Plan:           Plan Factors-Exercise tolerance (METS): >4 METS  Chart reviewed  Patient summary reviewed  Patient is not a current smoker  Obstructive sleep apnea risk education given perioperatively  Induction- intravenous  Postoperative Plan-     Informed Consent- Anesthetic plan and risks discussed with patient  I personally reviewed this patient with the CRNA  Discussed and agreed on the Anesthesia Plan with the CRNA  Sg Morfin

## 2020-09-01 NOTE — DISCHARGE INSTRUCTIONS
Colorectal Polyps   WHAT YOU NEED TO KNOW:   Colorectal polyps are small growths of tissue in the lining of the colon and rectum  Most polyps are hyperplastic polyps and are usually benign (noncancerous)  Certain types of polyps, called adenomatous polyps, may turn into cancer  DISCHARGE INSTRUCTIONS:   Follow up with your healthcare provider or gastroenterologist as directed: You may need to return for more tests, such as another colonoscopy  Write down your questions so you remember to ask them during your visits  Reduce your risk for colorectal polyps:   · Eat a variety of healthy foods:  Healthy foods include fruit, vegetables, whole-grain breads, low-fat dairy products, beans, lean meat, and fish  Ask if you need to be on a special diet  · Maintain a healthy weight:  Ask your healthcare provider if you need to lose weight and how much you need to lose  Ask for help with a weight loss program     · Exercise:  Begin to exercise slowly and do more as you get stronger  Talk with your healthcare provider before you start an exercise program      · Limit alcohol:  Your risk for polyps increases the more you drink  · Do not smoke: If you smoke, it is never too late to quit  Ask for information about how to stop  For support and more information:   · Nikhil Miles ([facility])  [address], [place] [address]  Phone: [phone]  Web Address: www digestive  niddk nih gov  Contact your healthcare provider or gastroenterologist if:   · You have a fever  · You have chills, a cough, or feel weak and achy  · You have abdominal pain that does not go away or gets worse after you take medicine  · Your abdomen is swollen  · You are losing weight without trying  · You have questions or concerns about your condition or care  Seek care immediately or call 911 if:   · You have sudden shortness of breath       · You have a fast heart rate, fast breathing, or are too dizzy to stand up  · You have severe abdominal pain  · You see blood in your bowel movement  © 2017 [address] Information is for End User's use only and may not be sold, redistributed or otherwise used for commercial purposes  All illustrations and images included in CareNotes® are the copyrighted property of A D A M , Inc  or Clemente Mayo  The above information is an  only  It is not intended as medical advice for individual conditions or treatments  Talk to your doctor, nurse or pharmacist before following any medical regimen to see if it is safe and effective for you  High Fiber Diet   AMBULATORY CARE:   A high-fiber diet  includes foods that have a high amount of fiber  Fiber is the part of fruits, vegetables, and grains that is not broken down by your body  Fiber keeps your bowel movements regular  Fiber can also help lower your cholesterol level, control blood sugar in people with diabetes, and relieve constipation  Fiber can also help you control your weight because it helps you feel full faster  Most adults should eat 25 to 35 grams of fiber each day  Talk to your dietitian or healthcare provider about the amount of fiber you need    Good sources of fiber:   · Foods with at least 4 grams of fiber per serving:      ¨ ? to ½ cup of high-fiber cereal (check the nutrition label on the box)    ¨ ½ cup of blackberries or raspberries    ¨ 4 dried prunes    ¨ 1 cooked artichoke    ¨ ½ cup of cooked legumes, such as lentils, or red, kidney, and andrews beans    · Foods with 1 to 3 grams of fiber per serving:      ¨ 1 slice of whole-wheat, pumpernickel, or rye bread    ¨ ½ cup of cooked brown rice    ¨ 4 whole-wheat crackers    ¨ 1 cup of oatmeal    ¨ ½ cup of cereal with 1 to 3 grams of fiber per serving (check the nutrition label on the box)    ¨ 1 small piece of fruit, such as an apple, banana, pear, kiwi, or orange    ¨ 3 dates    ¨ ½ cup of canned apricots, fruit cocktail, peaches, or pears    ¨ ½ cup of raw or cooked vegetables, such as carrots, cauliflower, cabbage, spinach, squash, or corn  Ways that you can increase fiber in your diet:   · Choose brown or wild rice instead of white rice  · Use whole wheat flour in recipes instead of white or all-purpose flour  · Add beans and peas to casseroles or soups  · Choose fresh fruit and vegetables with peels or skins on instead of juices  Other diet guidelines to follow:   · Add fiber to your diet slowly  You may have abdominal discomfort, bloating, and gas if you add fiber to your diet too quickly  · Drink plenty of liquids as you add fiber to your diet  You may have nausea or develop constipation if you do not drink enough water  Ask how much liquid to drink each day and which liquids are best for you  © 2017 [address] Information is for End User's use only and may not be sold, redistributed or otherwise used for commercial purposes  All illustrations and images included in CareNotes® are the copyrighted property of [employer]  or Clemente Mayo  The above information is an  only  It is not intended as medical advice for individual conditions or treatments  Talk to your doctor, nurse or pharmacist before following any medical regimen to see if it is safe and effective for you  Diverticulosis   WHAT YOU NEED TO KNOW:   Diverticulosis is a condition that causes small pockets called diverticula to form in your intestine  These pockets make it difficult for bowel movements to pass through your digestive system  DISCHARGE INSTRUCTIONS:   Return to the emergency department if:   · You have severe pain on the left side of your lower abdomen  · Your bowel movements are bright or dark red  Contact your healthcare provider if:   · You have a fever and chills  · You feel dizzy or lightheaded       · You have nausea, or you are vomiting  · You have a change in your bowel movements  · You have questions or concerns about your condition or care  Medicines:   · Medicines  to soften your bowel movements may be given  You may also need medicines to treat symptoms such as bloating and pain  · Take your medicine as directed  Contact your healthcare provider if you think your medicine is not helping or if you have side effects  Tell him or her if you are allergic to any medicine  Keep a list of the medicines, vitamins, and herbs you take  Include the amounts, and when and why you take them  Bring the list or the pill bottles to follow-up visits  Carry your medicine list with you in case of an emergency  Self-care: The goal of treatment is to manage any symptoms you have and prevent other problems such as diverticulitis  Diverticulitis is swelling or infection of the diverticula  Your healthcare provider may recommend any of the following:  · Eat a variety of high-fiber foods  High-fiber foods help you have regular bowel movements  High-fiber foods include cooked beans, fruits, vegetables, and some cereals  Most adults need 25 to 35 grams of fiber each day  Your healthcare provider may recommend that you have more  Ask your healthcare provider how much fiber you need  Increase fiber slowly  You may have abdominal discomfort, bloating, and gas if you add fiber to your diet too quickly  You may need to take a fiber supplement if you are not getting enough fiber from food  · Drink liquids as directed  You may need to drink 2 to 3 liters (8 to 12 cups) of liquids every day  Ask your healthcare provider how much liquid to drink each day and which liquids are best for you  · Apply heat  on your abdomen for 20 to 30 minutes every 2 hours for as many days as directed  Heat helps decrease pain and muscle spasms  Help prevent diverticulitis or other symptoms:   The following may help decrease your risk for diverticulitis or symptoms, such as bleeding  Talk to your provider about these or other things you can do to prevent problems that may occur with diverticulosis  · Exercise regularly  Ask your healthcare provider about the best exercise plan for you  Exercise can help you have regular bowel movements  Get 30 minutes of exercise on most days of the week  · Maintain a healthy weight  Ask your healthcare provider how much you should weigh  Ask him or her to help you create a weight loss plan if you are overweight  · Do not smoke  Nicotine and other chemicals in cigarettes increase your risk for diverticulitis  Ask your healthcare provider for information if you currently smoke and need help to quit  E-cigarettes or smokeless tobacco still contain nicotine  Talk to your healthcare provider before you use these products  · Ask your healthcare provider if it is safe to take NSAIDs  NSAIDs may increase your risk of diverticulitis  Follow up with your healthcare provider as directed:  Write down your questions so you remember to ask them during your visits  © 2017 [address]  Information is for End User's use only and may not be sold, redistributed or otherwise used for commercial purposes  All illustrations and images included in CareNotes® are the copyrighted property of [employer]  or Clemente Mayo  The above information is an  only  It is not intended as medical advice for individual conditions or treatments  Talk to your doctor, nurse or pharmacist before following any medical regimen to see if it is safe and effective for you  Hiatal Hernia   AMBULATORY CARE:   A hiatal hernia  is a condition that causes part of your stomach to bulge through the hiatus (small opening) in your diaphragm  The part of the stomach may move up and down, or it may get trapped above the diaphragm  Common symptoms include the following: The most common symptom is heartburn   This usually occurs after meals and spreads to your neck, jaw, or shoulder  You may have no signs or symptoms, or you may have any of the following:  · Abdominal pain, especially in the area just above your navel    · Bitter or acid taste in your mouth    · Trouble swallowing    · Coughing or hoarseness    · Chest pain or shortness of breath that occurs after eating    · Frequent burping or hiccups    · Uncomfortable feeling of fullness after eating  Seek care immediately if:   · You have severe abdominal pain  · You try to vomit but nothing comes out (retching)  · You have severe chest pain and sudden trouble breathing  · Your bowel movements are black or bloody  · Your vomit looks like coffee grounds or has blood in it  Contact your healthcare provider if:   · Your symptoms are getting worse  · You have nausea, and you are vomiting  · You are losing weight without trying  · You have questions or concerns about your condition or care  Treatment for hiatal hernia  depends on the type of hiatal hernia you have and on your symptoms  You may not need any treatment  You may need any of the following:  · Medicines  may be given to relieve heartburn symptoms  These medicines help to decrease or block stomach acid  You may also be given medicines that help to tighten the esophageal sphincter  · Surgery  may be done when medicines cannot control your symptoms, or other problems are present  Your healthcare provider may also suggest surgery depending on the type of hernia you have  Your healthcare provider can put your stomach back into its normal location  He may make the hiatus (hole) smaller and anchor your stomach in your abdomen  Fundoplication is a surgery that wraps the upper part of the stomach around the esophageal sphincter to strengthen it  Manage symptoms: The following nutrition and lifestyle changes may be recommended to relieve symptoms of heartburn    · Avoid foods that make your symptoms worse   These may include spicy foods, fruit juices, alcohol, caffeine, chocolate, and mint  · Eat several small meals during the day  Small meals give your stomach less food to digest     · Avoid lying down and bending forward after you eat  Do not eat meals 2 to 3 hours before bedtime  This decreases your risk for reflux  · Maintain a healthy weight  If you are overweight, weight loss may help relieve your symptoms  · Sleep with your head elevated  at least 6 inches  · Do not smoke  Smoking can increase your symptoms of heartburn  Follow up with your healthcare provider as directed:  Write down your questions so you remember to ask them during your visits  © 2017 [address]  Information is for End User's use only and may not be sold, redistributed or otherwise used for commercial purposes  All illustrations and images included in CareNotes® are the copyrighted property of [employer] D [employer] M [employer] Inc  or Clemente Mayo  The above information is an  only  It is not intended as medical advice for individual conditions or treatments  Talk to your doctor, nurse or pharmacist before following any medical regimen to see if it is safe and effective for you  Roque Esophagus   WHAT YOU NEED TO KNOW:   What is Roque esophagus? Roque esophagus is a condition in which the cells that line your esophagus are damaged  The damage can cause abnormal changes in the cells  These abnormal changes increase your risk of esophageal cancer  What increases my risk for Roque esophagus? The exact cause of Roque esophagus is not known  The following may increase your risk:  · Long-term gastroesophageal reflux disease (GERD), a condition that causes stomach acid to back up into the esophagus    · Age older than 48    · Family history of GERD, Roque esophagus, or esophageal cancer    · Obesity    · Smoking  What are the signs and symptoms of Roque esophagus?   Signs and symptoms of Roque esophagus are usually related to the signs and symptoms of GERD  You may also have any of the following:  · Heartburn    · Difficult or painful swallowing    · Bitter or acid taste in your mouth    · Cough    · Frequent burping or hiccups    · Vomiting blood or having black, tarry bowel movements    · Weight loss without trying  How is Roque esophagus diagnosed? · An endoscopy  is a procedure in which a scope is used to see the inside of your esophagus and stomach  A scope is a long, bendable tube with a light on the end of it  A camera may be hooked to the scope  A biopsy may also be done  A biopsy is when your healthcare provider takes tissue samples from your esophagus and sends it to a lab for tests  These tests will show if the cells of your esophagus have dysplasia (abnormal changes in your cells and tissues)  · The grade  of dysplasia is the amount of abnormal change that is present in your esophagus  The grade can range from negative (no dysplasia) to high-grade (a large amount of dysplasia)  There is a higher risk of cancer with high-grade dysplasia  How is Roque esophagus treated? Treatment depends on the grade of dysplasia  The goal of treatment is to control your symptoms and prevent esophageal cancer  Your healthcare provider may also suggest that you make changes in the foods you eat and in your lifestyle  You may need any of the following:  · Anti-reflux medicines  help decrease the stomach acid that can irritate your esophagus and stomach  These medicines may include proton pump inhibitors (PPI) and histamine type-2 receptor (H2) blockers  You may also be given medicines to stop vomiting  · Surgery or other procedures  may be done if you have high-grade dysplasia  ¨ Fundoplication  is a surgery that wraps the upper part of your stomach around the esophageal sphincter to strengthen it   The esophageal sphincter is the muscle at the lower end of the esophagus, right above the stomach  This may help to prevent reflux  ¨ Resection or esophagectomy  is surgery to remove a part of or the entire esophagus  ¨ Ablation  is procedure that kills abnormal cells with heat or by freezing them  ¨ Photodynamic therapy  is a procedure that uses a special type of light to kill abnormal cells  It is used in combination with medicines that make the abnormal cells sensitive to light  When should I contact my healthcare provider? · Your symptoms do not improve with treatment  · You have questions or concerns about your condition or care  When should I seek immediate care or call 911? · You have severe chest pain and shortness of breath  · Your bowel movements are black, bloody, or tarry  · Your vomit looks like coffee grounds or has blood in it  CARE AGREEMENT:   You have the right to help plan your care  Learn about your health condition and how it may be treated  Discuss treatment options with your caregivers to decide what care you want to receive  You always have the right to refuse treatment  The above information is an  only  It is not intended as medical advice for individual conditions or treatments  Talk to your doctor, nurse or pharmacist before following any medical regimen to see if it is safe and effective for you  © 2017 [address] [facility] Information is for End User's use only and may not be sold, redistributed or otherwise used for commercial purposes  All illustrations and images included in CareNotes® are the copyrighted property of [employer] M [employer] Inc  or Clemente Mayo  Colonoscopy   WHAT YOU NEED TO KNOW:   A colonoscopy is a procedure to examine the inside of your colon (intestine) with a scope  Polyps or tissue growths may have been removed during your colonoscopy  It is normal to feel bloated and to have some abdominal discomfort  You should be passing gas   If you have hemorrhoids or you had polyps removed, you may have a small amount of bleeding  DISCHARGE INSTRUCTIONS:   Seek care immediately if:   · You have a large amount of bright red blood in your bowel movements  · Your abdomen is hard and firm and you have severe pain  · You have sudden trouble breathing  Contact your healthcare provider if:   · You develop a rash or hives  · You have a fever within 24 hours of your procedure       · You have not had a bowel movement for 3 days after your procedure  · You have questions or concerns about your condition or care  Activity:   · Do not lift, strain, or run  for 3 days after your procedure  · Rest after your procedure  You have been given medicine to relax you  Do not  drive or make important decisions until the day after your procedure  Return to your normal activity as directed  · Relieve gas and discomfort from bloating  by lying on your right side with a heating pad on your abdomen  You may need to take short walks to help the gas move out  Eat small meals until bloating is relieved  If you had polyps removed: For 7 days after your procedure:  · Do not  take aspirin  · Do not  go on long car rides  Follow up with your healthcare provider as directed:  Write down your questions so you remember to ask them during your visits  © 2017 [address] is for End User's use only and may not be sold, redistributed or otherwise used for commercial purposes  All illustrations and images included in CareNotes® are the copyrighted property of [employer]  or Clemente Mayo  The above information is an  only  It is not intended as medical advice for individual conditions or treatments  Talk to your doctor, nurse or pharmacist before following any medical regimen to see if it is safe and effective for you    Upper Endoscopy   WHAT YOU NEED TO KNOW:   An upper endoscopy is also called an upper gastrointestinal (GI) endoscopy, or an esophagogastroduodenoscopy (EGD)  You may feel bloated, gassy, or have some abdominal discomfort after your procedure  Your throat may be sore for 24 to 36 hours  You may burp or pass gas from air that is still inside your body  DISCHARGE INSTRUCTIONS:   Call 911 for any of the following:   · You have sudden chest pain or trouble breathing  Seek care immediately if:   · You feel dizzy or faint  · You have trouble swallowing  · Your bowel movements are very dark or black  · Your abdomen is hard and firm and you have severe pain  · You vomit blood  Contact your healthcare provider if:   · You feel full or bloated and cannot burp or pass gas  · You have not had a bowel movement for 3 days after your procedure  · You have neck pain  · You have a fever or chills  · You have nausea or are vomiting  · You have a rash or hives  · You have questions or concerns about your endoscopy  Relieve a sore throat:  Suck on throat lozenges or crushed ice  Gargle with a small amount of warm salt water  Mix 1 teaspoon of salt and 1 cup of warm water to make salt water  Relieve gas and discomfort from bloating:  Lie on your right side with a heating pad on your abdomen  Take short walks to help pass gas  Eat small meals until bloating is relieved  Rest after your procedure: You have been given medicine to relax you  Do not  drive or make important decisions until the day after your procedure  Return to your normal activity as directed  You can usually return to work the day after your procedure  Follow up with your healthcare provider as directed:  Write down your questions so you remember to ask them during your visits  © 2017 [address] is for End User's use only and may not be sold, redistributed or otherwise used for commercial purposes   All illustrations and images included in CareNotes® are the copyrighted property of [employer]  or [facility]  The above information is an  only  It is not intended as medical advice for individual conditions or treatments  Talk to your doctor, nurse or pharmacist before following any medical regimen to see if it is safe and effective for you

## 2020-09-04 ENCOUNTER — TELEPHONE (OUTPATIENT)
Dept: SURGERY | Facility: CLINIC | Age: 70
End: 2020-09-04

## 2020-09-04 NOTE — TELEPHONE ENCOUNTER
Patient contacted by phone  Results of pathology reviewed with the patient and all questions answered to their satisfaction

## 2020-09-16 ENCOUNTER — OFFICE VISIT (OUTPATIENT)
Dept: SURGERY | Facility: CLINIC | Age: 70
End: 2020-09-16
Payer: MEDICARE

## 2020-09-16 VITALS — TEMPERATURE: 98.6 F

## 2020-09-16 DIAGNOSIS — K63.5 HYPERPLASTIC POLYP OF TRANSVERSE COLON: ICD-10-CM

## 2020-09-16 DIAGNOSIS — K22.70 BARRETT'S ESOPHAGUS WITHOUT DYSPLASIA: Primary | ICD-10-CM

## 2020-09-16 DIAGNOSIS — K21.9 GASTROESOPHAGEAL REFLUX DISEASE WITHOUT ESOPHAGITIS: ICD-10-CM

## 2020-09-16 DIAGNOSIS — K63.5 POLYP OF HEPATIC FLEXURE OF COLON: ICD-10-CM

## 2020-09-16 DIAGNOSIS — D12.0 ADENOMATOUS POLYP OF CECUM: ICD-10-CM

## 2020-09-16 PROCEDURE — 99213 OFFICE O/P EST LOW 20 MIN: CPT | Performed by: SURGERY

## 2020-09-16 RX ORDER — OMEPRAZOLE 20 MG/1
20 CAPSULE, DELAYED RELEASE ORAL EVERY MORNING
Qty: 90 CAPSULE | Refills: 4 | Status: SHIPPED | OUTPATIENT
Start: 2020-09-16 | End: 2021-10-06 | Stop reason: SDUPTHER

## 2020-09-16 NOTE — PROGRESS NOTES
Assessment/Plan:    Roque's esophagus without dysplasia  The patient returns for routine follow-up status post EGD to have investigate his Roque's esophagus  The endoscopic findings were explained to the patient  His Roque's esophagus appears stable without evidence of progression of disease  I have recommended repeat upper endoscopy in 2 years time  Adenomatous polyp of cecum  Patient colonoscopy reveals no evidence of recurrent adenomatous polyp in the cecum  Pathology report reviewed with the patient and all questions answered to his satisfaction  I have recommended repeat colonoscopy in 5 years time  Hyperplastic polyp of transverse colon  Pathology report reviewed  Will repeat colonoscopy in 5 years  Polyp of hepatic flexure of colon  No indications for additional investigations or interventions at the present time  Subjective:      Patient ID: Consuelo Gudino is a 69 y o  male  Patient is here today po s/p EGD and Coloscopy 9-1-2020  Patient reports that he is doing well since his procedures and denies and new problems or concerns  Ami Brunner MA          Review of Systems   Constitutional: Negative  HENT: Negative  Eyes: Negative  Respiratory: Negative  Cardiovascular: Negative  Gastrointestinal: Negative  Endocrine: Negative  Genitourinary: Negative  Musculoskeletal: Negative  Skin: Negative  Allergic/Immunologic: Negative  Neurological: Negative  Hematological: Negative  Psychiatric/Behavioral: Negative  Objective:      Temp 98 6 °F (37 °C) (Temporal)          Physical Exam  Vitals signs and nursing note reviewed  Constitutional:       Appearance: He is well-developed  HENT:      Head: Normocephalic and atraumatic  Eyes:      Conjunctiva/sclera: Conjunctivae normal       Pupils: Pupils are equal, round, and reactive to light  Neck:      Musculoskeletal: Normal range of motion and neck supple     Cardiovascular: Rate and Rhythm: Normal rate and regular rhythm  Pulmonary:      Effort: Pulmonary effort is normal       Breath sounds: Normal breath sounds  Abdominal:      General: Bowel sounds are normal       Palpations: Abdomen is soft  Musculoskeletal: Normal range of motion  Skin:     General: Skin is warm and dry  Neurological:      Mental Status: He is alert and oriented to person, place, and time  Psychiatric:         Behavior: Behavior normal          Thought Content:  Thought content normal          Judgment: Judgment normal

## 2020-09-16 NOTE — ASSESSMENT & PLAN NOTE
The patient returns for routine follow-up status post EGD to have investigate his Roque's esophagus  The endoscopic findings were explained to the patient  His Roque's esophagus appears stable without evidence of progression of disease  I have recommended repeat upper endoscopy in 2 years time

## 2020-09-16 NOTE — LETTER
September 16, 2020     Nivia Sheffield DO  [address]  [facility] [address]    Patient: Rita Mott   YOB: 1950   Date of Visit: 9/16/2020       Dear Dr Bo Fly: Thank you for referring Charlie Kilgore to me for evaluation  Below are my notes for this consultation  If you have questions, please do not hesitate to call me  I look forward to following your patient along with you  Sincerely,        Rosario Saucedo MD        CC: No Recipients  Rosario Saucedo MD  9/16/2020  2:43 PM  Incomplete  Assessment/Plan:    Roque's esophagus without dysplasia  The patient returns for routine follow-up status post EGD to have investigate his Roque's esophagus  The endoscopic findings were explained to the patient  His Roque's esophagus appears stable without evidence of progression of disease  I have recommended repeat upper endoscopy in 2 years time  Adenomatous polyp of cecum  Patient colonoscopy reveals no evidence of recurrent adenomatous polyp in the cecum  Pathology report reviewed with the patient and all questions answered to his satisfaction  I have recommended repeat colonoscopy in 5 years time  Hyperplastic polyp of transverse colon  Pathology report reviewed  Will repeat colonoscopy in 5 years  Polyp of hepatic flexure of colon  No indications for additional investigations or interventions at the present time  Subjective:      Patient ID: Rita Mott is a 69 y o  male  Patient is here today po s/p EGD and Coloscopy 9-1-2020  Patient reports that he is doing well since his procedures and denies and new problems or concerns  Ami Brunner MA          Review of Systems   Constitutional: Negative  HENT: Negative  Eyes: Negative  Respiratory: Negative  Cardiovascular: Negative  Gastrointestinal: Negative  Endocrine: Negative  Genitourinary: Negative  Musculoskeletal: Negative  Skin: Negative  Allergic/Immunologic: Negative  Neurological: Negative  Hematological: Negative  Psychiatric/Behavioral: Negative  Objective:      Temp 98 6 °F (37 °C) (Temporal)          Physical Exam  Vitals signs and nursing note reviewed  Constitutional:       Appearance: He is well-developed  HENT:      Head: Normocephalic and atraumatic  Eyes:      Conjunctiva/sclera: Conjunctivae normal       Pupils: Pupils are equal, round, and reactive to light  Neck:      Musculoskeletal: Normal range of motion and neck supple  Cardiovascular:      Rate and Rhythm: Normal rate and regular rhythm  Pulmonary:      Effort: Pulmonary effort is normal       Breath sounds: Normal breath sounds  Abdominal:      General: Bowel sounds are normal       Palpations: Abdomen is soft  Musculoskeletal: Normal range of motion  Skin:     General: Skin is warm and dry  Neurological:      Mental Status: He is alert and oriented to person, place, and time  Psychiatric:         Behavior: Behavior normal          Thought Content:  Thought content normal          Judgment: Judgment normal

## 2020-09-16 NOTE — ASSESSMENT & PLAN NOTE
Patient colonoscopy reveals no evidence of recurrent adenomatous polyp in the cecum  Pathology report reviewed with the patient and all questions answered to his satisfaction  I have recommended repeat colonoscopy in 5 years time

## 2020-10-14 ENCOUNTER — CLINICAL SUPPORT (OUTPATIENT)
Dept: URGENT CARE | Facility: CLINIC | Age: 70
End: 2020-10-14
Payer: MEDICARE

## 2020-10-14 DIAGNOSIS — Z23 ENCOUNTER FOR IMMUNIZATION: Primary | ICD-10-CM

## 2020-10-14 PROCEDURE — 90662 IIV NO PRSV INCREASED AG IM: CPT

## 2020-10-14 PROCEDURE — G0008 ADMIN INFLUENZA VIRUS VAC: HCPCS

## 2020-10-28 ENCOUNTER — TRANSCRIBE ORDERS (OUTPATIENT)
Dept: ADMINISTRATIVE | Facility: HOSPITAL | Age: 70
End: 2020-10-28

## 2020-10-28 ENCOUNTER — LAB (OUTPATIENT)
Dept: LAB | Facility: CLINIC | Age: 70
End: 2020-10-28
Payer: MEDICARE

## 2020-10-28 DIAGNOSIS — C62.11 MALIGNANT NEOPLASM OF DESCENDED RIGHT TESTIS (HCC): Primary | ICD-10-CM

## 2020-10-28 DIAGNOSIS — Z85.828 PERSONAL HISTORY OF OTHER MALIGNANT NEOPLASM OF SKIN: ICD-10-CM

## 2020-10-28 DIAGNOSIS — C62.11 MALIGNANT NEOPLASM OF DESCENDED RIGHT TESTIS (HCC): ICD-10-CM

## 2020-10-28 LAB
AFP-TM SERPL-MCNC: 5.5 NG/ML (ref 0.5–8)
ALBUMIN SERPL BCP-MCNC: 3.6 G/DL (ref 3.5–5)
ALP SERPL-CCNC: 44 U/L (ref 46–116)
ALT SERPL W P-5'-P-CCNC: 25 U/L (ref 12–78)
ANION GAP SERPL CALCULATED.3IONS-SCNC: 8 MMOL/L (ref 4–13)
AST SERPL W P-5'-P-CCNC: 19 U/L (ref 5–45)
B-HCG SERPL-ACNC: <2 MIU/ML
BASOPHILS # BLD AUTO: 0.09 THOUSANDS/ΜL (ref 0–0.1)
BASOPHILS NFR BLD AUTO: 1 % (ref 0–1)
BILIRUB SERPL-MCNC: 0.7 MG/DL (ref 0.2–1)
BUN SERPL-MCNC: 13 MG/DL (ref 5–25)
CALCIUM SERPL-MCNC: 8.9 MG/DL (ref 8.3–10.1)
CHLORIDE SERPL-SCNC: 100 MMOL/L (ref 100–108)
CO2 SERPL-SCNC: 25 MMOL/L (ref 21–32)
CREAT SERPL-MCNC: 1.03 MG/DL (ref 0.6–1.3)
EOSINOPHIL # BLD AUTO: 0.16 THOUSAND/ΜL (ref 0–0.61)
EOSINOPHIL NFR BLD AUTO: 2 % (ref 0–6)
ERYTHROCYTE [DISTWIDTH] IN BLOOD BY AUTOMATED COUNT: 12.3 % (ref 11.6–15.1)
GFR SERPL CREATININE-BSD FRML MDRD: 73 ML/MIN/1.73SQ M
GLUCOSE P FAST SERPL-MCNC: 67 MG/DL (ref 65–99)
HCT VFR BLD AUTO: 35.7 % (ref 36.5–49.3)
HGB BLD-MCNC: 12 G/DL (ref 12–17)
IMM GRANULOCYTES # BLD AUTO: 0.04 THOUSAND/UL (ref 0–0.2)
IMM GRANULOCYTES NFR BLD AUTO: 0 % (ref 0–2)
LDH SERPL-CCNC: 201 U/L (ref 81–234)
LYMPHOCYTES # BLD AUTO: 1.39 THOUSANDS/ΜL (ref 0.6–4.47)
LYMPHOCYTES NFR BLD AUTO: 15 % (ref 14–44)
MCH RBC QN AUTO: 33.6 PG (ref 26.8–34.3)
MCHC RBC AUTO-ENTMCNC: 33.6 G/DL (ref 31.4–37.4)
MCV RBC AUTO: 100 FL (ref 82–98)
MONOCYTES # BLD AUTO: 0.97 THOUSAND/ΜL (ref 0.17–1.22)
MONOCYTES NFR BLD AUTO: 11 % (ref 4–12)
NEUTROPHILS # BLD AUTO: 6.46 THOUSANDS/ΜL (ref 1.85–7.62)
NEUTS SEG NFR BLD AUTO: 71 % (ref 43–75)
NRBC BLD AUTO-RTO: 0 /100 WBCS
PLATELET # BLD AUTO: 356 THOUSANDS/UL (ref 149–390)
PMV BLD AUTO: 8.9 FL (ref 8.9–12.7)
POTASSIUM SERPL-SCNC: 3.8 MMOL/L (ref 3.5–5.3)
PROT SERPL-MCNC: 7 G/DL (ref 6.4–8.2)
RBC # BLD AUTO: 3.57 MILLION/UL (ref 3.88–5.62)
SODIUM SERPL-SCNC: 133 MMOL/L (ref 136–145)
WBC # BLD AUTO: 9.11 THOUSAND/UL (ref 4.31–10.16)

## 2020-10-28 PROCEDURE — 36415 COLL VENOUS BLD VENIPUNCTURE: CPT

## 2020-10-28 PROCEDURE — 85025 COMPLETE CBC W/AUTO DIFF WBC: CPT

## 2020-10-28 PROCEDURE — 82105 ALPHA-FETOPROTEIN SERUM: CPT

## 2020-10-28 PROCEDURE — 80053 COMPREHEN METABOLIC PANEL: CPT

## 2020-10-28 PROCEDURE — 84702 CHORIONIC GONADOTROPIN TEST: CPT

## 2020-10-28 PROCEDURE — 83615 LACTATE (LD) (LDH) ENZYME: CPT

## 2020-11-16 ENCOUNTER — TRANSCRIBE ORDERS (OUTPATIENT)
Dept: ADMINISTRATIVE | Facility: HOSPITAL | Age: 70
End: 2020-11-16

## 2020-11-16 DIAGNOSIS — K86.9 DISEASE OF PANCREAS, UNSPECIFIED: Primary | ICD-10-CM

## 2020-11-20 ENCOUNTER — HOSPITAL ENCOUNTER (OUTPATIENT)
Dept: MRI IMAGING | Facility: HOSPITAL | Age: 70
Discharge: HOME | End: 2020-11-20
Payer: MEDICARE

## 2020-11-20 DIAGNOSIS — K86.9 DISEASE OF PANCREAS, UNSPECIFIED: ICD-10-CM

## 2020-11-20 PROCEDURE — G1004 CDSM NDSC: HCPCS

## 2020-11-20 PROCEDURE — A9585 GADOBUTROL INJECTION: HCPCS | Performed by: RADIOLOGY

## 2020-11-20 PROCEDURE — 74183 MRI ABD W/O CNTR FLWD CNTR: CPT

## 2020-11-20 RX ADMIN — GADOBUTROL 11 ML: 604.72 INJECTION INTRAVENOUS at 16:17

## 2021-05-03 ENCOUNTER — APPOINTMENT (OUTPATIENT)
Dept: LAB | Facility: CLINIC | Age: 71
End: 2021-05-03
Payer: MEDICARE

## 2021-05-03 ENCOUNTER — OFFICE VISIT (OUTPATIENT)
Dept: URGENT CARE | Facility: CLINIC | Age: 71
End: 2021-05-03
Payer: MEDICARE

## 2021-05-03 ENCOUNTER — TRANSCRIBE ORDERS (OUTPATIENT)
Dept: ADMINISTRATIVE | Facility: HOSPITAL | Age: 71
End: 2021-05-03

## 2021-05-03 VITALS
DIASTOLIC BLOOD PRESSURE: 100 MMHG | HEART RATE: 73 BPM | WEIGHT: 210 LBS | TEMPERATURE: 99 F | OXYGEN SATURATION: 99 % | HEIGHT: 67 IN | SYSTOLIC BLOOD PRESSURE: 154 MMHG | RESPIRATION RATE: 16 BRPM | BODY MASS INDEX: 32.96 KG/M2

## 2021-05-03 DIAGNOSIS — Z85.828 PERSONAL HISTORY OF OTHER MALIGNANT NEOPLASM OF SKIN: ICD-10-CM

## 2021-05-03 DIAGNOSIS — C62.11 MALIGNANT NEOPLASM OF DESCENDED RIGHT TESTIS (HCC): ICD-10-CM

## 2021-05-03 DIAGNOSIS — B02.9 HERPES ZOSTER WITHOUT COMPLICATION: Primary | ICD-10-CM

## 2021-05-03 DIAGNOSIS — C62.11 MALIGNANT NEOPLASM OF DESCENDED RIGHT TESTIS (HCC): Primary | ICD-10-CM

## 2021-05-03 LAB
AFP-TM SERPL-MCNC: 5.5 NG/ML (ref 0.5–8)
ALBUMIN SERPL BCP-MCNC: 3.5 G/DL (ref 3.5–5)
ALP SERPL-CCNC: 47 U/L (ref 46–116)
ALT SERPL W P-5'-P-CCNC: 22 U/L (ref 12–78)
ANION GAP SERPL CALCULATED.3IONS-SCNC: 8 MMOL/L (ref 4–13)
AST SERPL W P-5'-P-CCNC: 15 U/L (ref 5–45)
B-HCG SERPL-ACNC: <2 MIU/ML
BASOPHILS # BLD AUTO: 0.08 THOUSANDS/ΜL (ref 0–0.1)
BASOPHILS NFR BLD AUTO: 1 % (ref 0–1)
BILIRUB SERPL-MCNC: 0.66 MG/DL (ref 0.2–1)
BUN SERPL-MCNC: 11 MG/DL (ref 5–25)
CALCIUM SERPL-MCNC: 9.1 MG/DL (ref 8.3–10.1)
CHLORIDE SERPL-SCNC: 100 MMOL/L (ref 100–108)
CO2 SERPL-SCNC: 25 MMOL/L (ref 21–32)
CREAT SERPL-MCNC: 0.97 MG/DL (ref 0.6–1.3)
EOSINOPHIL # BLD AUTO: 0.15 THOUSAND/ΜL (ref 0–0.61)
EOSINOPHIL NFR BLD AUTO: 3 % (ref 0–6)
ERYTHROCYTE [DISTWIDTH] IN BLOOD BY AUTOMATED COUNT: 12.4 % (ref 11.6–15.1)
GFR SERPL CREATININE-BSD FRML MDRD: 78 ML/MIN/1.73SQ M
GLUCOSE P FAST SERPL-MCNC: 100 MG/DL (ref 65–99)
HCT VFR BLD AUTO: 37.4 % (ref 36.5–49.3)
HGB BLD-MCNC: 12.7 G/DL (ref 12–17)
IMM GRANULOCYTES # BLD AUTO: 0.02 THOUSAND/UL (ref 0–0.2)
IMM GRANULOCYTES NFR BLD AUTO: 0 % (ref 0–2)
LDH SERPL-CCNC: 172 U/L (ref 81–234)
LYMPHOCYTES # BLD AUTO: 1.59 THOUSANDS/ΜL (ref 0.6–4.47)
LYMPHOCYTES NFR BLD AUTO: 27 % (ref 14–44)
MCH RBC QN AUTO: 32.7 PG (ref 26.8–34.3)
MCHC RBC AUTO-ENTMCNC: 34 G/DL (ref 31.4–37.4)
MCV RBC AUTO: 96 FL (ref 82–98)
MONOCYTES # BLD AUTO: 0.93 THOUSAND/ΜL (ref 0.17–1.22)
MONOCYTES NFR BLD AUTO: 16 % (ref 4–12)
NEUTROPHILS # BLD AUTO: 3.09 THOUSANDS/ΜL (ref 1.85–7.62)
NEUTS SEG NFR BLD AUTO: 53 % (ref 43–75)
NRBC BLD AUTO-RTO: 0 /100 WBCS
PLATELET # BLD AUTO: 336 THOUSANDS/UL (ref 149–390)
PMV BLD AUTO: 9.5 FL (ref 8.9–12.7)
POTASSIUM SERPL-SCNC: 4.1 MMOL/L (ref 3.5–5.3)
PROT SERPL-MCNC: 7.1 G/DL (ref 6.4–8.2)
RBC # BLD AUTO: 3.88 MILLION/UL (ref 3.88–5.62)
SODIUM SERPL-SCNC: 133 MMOL/L (ref 136–145)
WBC # BLD AUTO: 5.86 THOUSAND/UL (ref 4.31–10.16)

## 2021-05-03 PROCEDURE — 84702 CHORIONIC GONADOTROPIN TEST: CPT

## 2021-05-03 PROCEDURE — 36415 COLL VENOUS BLD VENIPUNCTURE: CPT

## 2021-05-03 PROCEDURE — 99213 OFFICE O/P EST LOW 20 MIN: CPT | Performed by: PHYSICIAN ASSISTANT

## 2021-05-03 PROCEDURE — 80053 COMPREHEN METABOLIC PANEL: CPT

## 2021-05-03 PROCEDURE — 83615 LACTATE (LD) (LDH) ENZYME: CPT

## 2021-05-03 PROCEDURE — G0463 HOSPITAL OUTPT CLINIC VISIT: HCPCS | Performed by: PHYSICIAN ASSISTANT

## 2021-05-03 PROCEDURE — 85025 COMPLETE CBC W/AUTO DIFF WBC: CPT

## 2021-05-03 PROCEDURE — 82105 ALPHA-FETOPROTEIN SERUM: CPT

## 2021-05-03 RX ORDER — VALACYCLOVIR HYDROCHLORIDE 1 G/1
1000 TABLET, FILM COATED ORAL 3 TIMES DAILY
Qty: 21 TABLET | Refills: 0 | Status: SHIPPED | OUTPATIENT
Start: 2021-05-03 | End: 2021-05-10

## 2021-05-03 NOTE — PATIENT INSTRUCTIONS
Medication 3x a day for 7 days   Follow up with your PCP    Shingles   WHAT YOU NEED TO KNOW:   Shingles is a painful rash  Shingles is caused by the same virus that causes chickenpox (varicella-zoster)  After you get chickenpox, the virus stays in your body for several years without causing any symptoms  Shingles occurs when the virus becomes active again  The active virus travels along a nerve to your skin and causes a rash  DISCHARGE INSTRUCTIONS:   Call your local emergency number (911 in the [address] [place] [address],[address]) if:   · You have trouble moving your arms, legs, or face  · You become confused, or have difficulty speaking  · You have a seizure  Return to the emergency department if:   · You have weakness in an arm or leg  · You have dizziness, a severe headache, or hearing or vision loss  · You have painful, red, warm skin around the blisters, or the blisters drain pus  · Your neck is stiff or you have trouble moving it  Call your doctor if:   · You feel weak or have a headache  · You have a cough, chills, or a fever  · You have abdominal pain or nausea, or you are vomiting  · Your rash becomes more itchy or painful  · Your rash spreads to other parts of your body  · Your pain worsens and does not go away even after you take medicine  · You have questions or concerns about your condition or care  Medicines: You may need any of the following:  · Antiviral medicine  helps decrease symptoms and healing time  They may also decrease your risk of developing nerve pain  You will need to start taking them within 3 days of the start of symptoms to prevent nerve pain  · Prescription pain medicine  may be given  Ask your healthcare provider how to take this medicine safely  Some prescription pain medicines contain acetaminophen  Do not take other medicines that contain acetaminophen without talking to your healthcare provider  Too much acetaminophen may cause liver damage   Prescription pain medicine may cause constipation  Ask your healthcare provider how to prevent or treat constipation  · Acetaminophen  decreases pain and fever  It is available without a doctor's order  Ask how much to take and how often to take it  Follow directions  Read the labels of all other medicines you are using to see if they also contain acetaminophen, or ask your doctor or pharmacist  Acetaminophen can cause liver damage if not taken correctly  Do not use more than 4 grams (4,000 milligrams) total of acetaminophen in one day  · NSAIDs , such as ibuprofen, help decrease swelling, pain, and fever  This medicine is available with or without a doctor's order  NSAIDs can cause stomach bleeding or kidney problems in certain people  If you take blood thinner medicine, always ask if NSAIDs are safe for you  Always read the medicine label and follow directions  Do not give these medicines to children under 10months of age without direction from your child's healthcare provider  · Topical anesthetics  are used to numb the skin and decrease pain  They can be a cream, gel, spray, or patch  · Anticonvulsants  decrease nerve pain and may help you sleep at night  · Antidepressants  may be used to decrease nerve pain  · Take your medicine as directed  Contact your healthcare provider if you think your medicine is not helping or if you have side effects  Tell him of her if you are allergic to any medicine  Keep a list of the medicines, vitamins, and herbs you take  Include the amounts, and when and why you take them  Bring the list or the pill bottles to follow-up visits  Carry your medicine list with you in case of an emergency  Self-care:  Keep your rash clean and dry  Cover your rash with a bandage or clothing  Do not use bandages that stick to your skin  The sticky part may irritate your skin and make your rash last longer  Prevent the spread of germs:       · Wash your hands often    Wash your hands several times each day  Wash after you use the bathroom, change a child's diaper, and before you prepare or eat food  Use soap and water every time  Rub your soapy hands together, lacing your fingers  Wash the front and back of your hands, and in between your fingers  Use the fingers of one hand to scrub under the fingernails of the other hand  Wash for at least 20 seconds  Rinse with warm, running water for several seconds  Then dry your hands with a clean towel or paper towel  Use hand  that contains alcohol if soap and water are not available  Do not touch your eyes, nose, or mouth without washing your hands first          · Cover a sneeze or cough  Use a tissue that covers your mouth and nose  Throw the tissue away in a trash can right away  Use the bend of your arm if a tissue is not available  Wash your hands well with soap and water or use a hand   · Stay away from others while you are sick  Avoid crowds as much as possible  · Ask about vaccines you may need  Talk to your healthcare provider about your vaccine history  He or she will tell you which vaccines you need, and when to get them  Prevent shingles or another shingles outbreak:  A vaccine may be given to help prevent shingles  You can get the vaccine even if you already had shingles  The vaccine can help prevent a future outbreak  If you do get shingles again, the vaccine can keep it from becoming severe  The vaccine comes in 2 forms  Your healthcare provider will tell you which form is right for you  The decision is based on your age and any medical conditions you have  A 2-dose vaccine is usually given to adults 48 years or older  A 1-dose vaccine may be given to adults 61 years or older    For more information:   · Centers for Disease Control and Prevention  [address] , [address]  Phone: 0- 842 - 4983839  Phone: 3- 423 - 8005915  Web Address: [employer] com     Follow up with your doctor as directed:  Write down your questions so you remember to ask them during your visits  © Copyright [address] Information is for End User's use only and may not be sold, redistributed or otherwise used for commercial purposes  All illustrations and images included in CareNotes® are the copyrighted property of A D A M , Inc  or Germain Fontanez  The above information is an  only  It is not intended as medical advice for individual conditions or treatments  Talk to your doctor, nurse or pharmacist before following any medical regimen to see if it is safe and effective for you

## 2021-05-03 NOTE — PROGRESS NOTES
[facility]        NAME: Agnes Brambila is a 69 y o  male  : 1950    MRN: [id number]  DATE: May 3, 2021  TIME: 8:28 AM    Assessment and Plan   Herpes zoster without complication [V44 7]  1  Herpes zoster without complication  valACYclovir (VALTREX) 1,000 mg tablet         Patient Instructions     Patient Instructions     Medication 3x a day for 7 days   Follow up with your PCP    Shingles   WHAT YOU NEED TO KNOW:   Shingles is a painful rash  Shingles is caused by the same virus that causes chickenpox (varicella-zoster)  After you get chickenpox, the virus stays in your body for several years without causing any symptoms  Shingles occurs when the virus becomes active again  The active virus travels along a nerve to your skin and causes a rash  DISCHARGE INSTRUCTIONS:   Call your local emergency number (911 in the [address] [place] [address],[address]) if:   · You have trouble moving your arms, legs, or face  · You become confused, or have difficulty speaking  · You have a seizure  Return to the emergency department if:   · You have weakness in an arm or leg  · You have dizziness, a severe headache, or hearing or vision loss  · You have painful, red, warm skin around the blisters, or the blisters drain pus  · Your neck is stiff or you have trouble moving it  Call your doctor if:   · You feel weak or have a headache  · You have a cough, chills, or a fever  · You have abdominal pain or nausea, or you are vomiting  · Your rash becomes more itchy or painful  · Your rash spreads to other parts of your body  · Your pain worsens and does not go away even after you take medicine  · You have questions or concerns about your condition or care  Medicines: You may need any of the following:  · Antiviral medicine  helps decrease symptoms and healing time  They may also decrease your risk of developing nerve pain   You will need to start taking them within 3 days of the start of symptoms to prevent nerve pain  · Prescription pain medicine  may be given  Ask your healthcare provider how to take this medicine safely  Some prescription pain medicines contain acetaminophen  Do not take other medicines that contain acetaminophen without talking to your healthcare provider  Too much acetaminophen may cause liver damage  Prescription pain medicine may cause constipation  Ask your healthcare provider how to prevent or treat constipation  · Acetaminophen  decreases pain and fever  It is available without a doctor's order  Ask how much to take and how often to take it  Follow directions  Read the labels of all other medicines you are using to see if they also contain acetaminophen, or ask your doctor or pharmacist  Acetaminophen can cause liver damage if not taken correctly  Do not use more than 4 grams (4,000 milligrams) total of acetaminophen in one day  · NSAIDs , such as ibuprofen, help decrease swelling, pain, and fever  This medicine is available with or without a doctor's order  NSAIDs can cause stomach bleeding or kidney problems in certain people  If you take blood thinner medicine, always ask if NSAIDs are safe for you  Always read the medicine label and follow directions  Do not give these medicines to children under 10months of age without direction from your child's healthcare provider  · Topical anesthetics  are used to numb the skin and decrease pain  They can be a cream, gel, spray, or patch  · Anticonvulsants  decrease nerve pain and may help you sleep at night  · Antidepressants  may be used to decrease nerve pain  · Take your medicine as directed  Contact your healthcare provider if you think your medicine is not helping or if you have side effects  Tell him of her if you are allergic to any medicine  Keep a list of the medicines, vitamins, and herbs you take  Include the amounts, and when and why you take them  Bring the list or the pill bottles to follow-up visits   Carry your medicine list with you in case of an emergency  Self-care:  Keep your rash clean and dry  Cover your rash with a bandage or clothing  Do not use bandages that stick to your skin  The sticky part may irritate your skin and make your rash last longer  Prevent the spread of germs:       · Wash your hands often  Wash your hands several times each day  Wash after you use the bathroom, change a child's diaper, and before you prepare or eat food  Use soap and water every time  Rub your soapy hands together, lacing your fingers  Wash the front and back of your hands, and in between your fingers  Use the fingers of one hand to scrub under the fingernails of the other hand  Wash for at least 20 seconds  Rinse with warm, running water for several seconds  Then dry your hands with a clean towel or paper towel  Use hand  that contains alcohol if soap and water are not available  Do not touch your eyes, nose, or mouth without washing your hands first          · Cover a sneeze or cough  Use a tissue that covers your mouth and nose  Throw the tissue away in a trash can right away  Use the bend of your arm if a tissue is not available  Wash your hands well with soap and water or use a hand   · Stay away from others while you are sick  Avoid crowds as much as possible  · Ask about vaccines you may need  Talk to your healthcare provider about your vaccine history  He or she will tell you which vaccines you need, and when to get them  Prevent shingles or another shingles outbreak:  A vaccine may be given to help prevent shingles  You can get the vaccine even if you already had shingles  The vaccine can help prevent a future outbreak  If you do get shingles again, the vaccine can keep it from becoming severe  The vaccine comes in 2 forms  Your healthcare provider will tell you which form is right for you  The decision is based on your age and any medical conditions you have   A 2-dose vaccine is usually given to adults 48 years or older  A 1-dose vaccine may be given to adults 61 years or older  For more information:   · Centers for Disease Control and Prevention  [address] , [address]  Phone: 3- 052 - 0996479  Phone: 9- 399 - 4039208  Web Address: [employer] com     Follow up with your doctor as directed:  Write down your questions so you remember to ask them during your visits  © Copyright [address] Information is for End User's use only and may not be sold, redistributed or otherwise used for commercial purposes  All illustrations and images included in CareNotes® are the copyrighted property of A D A M , Inc  or [facility] GVIS[facility] 1   The above information is an  only  It is not intended as medical advice for individual conditions or treatments  Talk to your doctor, nurse or pharmacist before following any medical regimen to see if it is safe and effective for you  Follow up with PCP in 3-5 days  Proceed to  ER if symptoms worsen  Chief Complaint     Chief Complaint   Patient presents with    Rash     on R hip, states area started burning 1 week ago and then a couple days ago broke out in a rash         History of Present Illness       The pt is a 11-year-old male presenting with a red, painful vesicular rash around his right hip  The skin was burning about a week ago and then a vesicular rash came out about 2 days ago  PT did not get his Shingles vaccine  NO other symptoms  The rash is very painful  IT is located around one dermatome only and on the right side only  No prior episodes  Review of Systems   Review of Systems   Constitutional: Negative for activity change, appetite change, fatigue and fever  Eyes: Negative for pain, redness and itching  Skin: Positive for rash  Negative for pallor and wound  Neurological: Negative for dizziness and headaches           Current Medications       Current Outpatient Medications:     CHERRY PO, Take 1 capsule by mouth every morning, Disp: , Rfl:     omeprazole (PriLOSEC) 20 mg delayed release capsule, Take 1 capsule (20 mg total) by mouth every morning, Disp: 90 capsule, Rfl: 4    Suprep Bowel Prep Kit 17 5-3 13-1 6 GM/177ML SOLN, Take 1 kit by mouth once for 1 dose, Disp: 1 Bottle, Rfl: 0    valACYclovir (VALTREX) 1,000 mg tablet, Take 1 tablet (1,000 mg total) by mouth 3 (three) times a day for 7 days, Disp: 21 tablet, Rfl: 0    Current Allergies     Allergies as of 05/03/2021    (No Known Allergies)            The following portions of the patient's history were reviewed and updated as appropriate: allergies, current medications, past family history, past medical history, past social history, past surgical history and problem list      Past Medical History:   Diagnosis Date    Arthritis     generalized    Roque's esophagus without dysplasia 6/19/2019    Cancer [facility])     testicular cancer and lung 2012  (had chemo 2012 and clear since)    Chronic pain disorder     herniated disc in lumbar area; monthly visit to chiropractor    Diverticulosis 6/19/2019    Gastroesophageal reflux disease without esophagitis 2/25/2019    Gout     History of colon polyps 6/19/2019    Kidney stone 2003? no problem since then       Past Surgical History:   Procedure Laterality Date    COLONOSCOPY  2016    CYSTOSCOPY W/ LASER LITHOTRIPSY  2003?  EGD  2015    gabriele    HERNIA REPAIR Left     x3 surgery    LUNG REMOVAL, PARTIAL Right 2012    right middle    SKIN BIOPSY      TESTICLE SURGERY Right 2010    removal       Family History   Problem Relation Age of Onset    Cancer Mother     Cancer Father     Thyroid cancer Brother     Tongue cancer Brother          Medications have been verified          Objective   /100   Pulse 73   Temp 99 °F (37 2 °C) (Temporal)   Resp 16   Ht 5' 7" (1 702 m)   Wt 95 3 kg (210 lb)   SpO2 99%   BMI 32 89 kg/m²        Physical Exam     Physical Exam  Vitals signs reviewed  Constitutional:       General: He is not in acute distress  Appearance: Normal appearance  He is normal weight  He is not ill-appearing, toxic-appearing or diaphoretic  HENT:      Head: Normocephalic and atraumatic  Neck:      Musculoskeletal: Normal range of motion  Cardiovascular:      Rate and Rhythm: Normal rate and regular rhythm  Heart sounds: Normal heart sounds  No murmur  No friction rub  No gallop  Pulmonary:      Effort: Pulmonary effort is normal  No respiratory distress  Breath sounds: Normal breath sounds  No stridor  No wheezing, rhonchi or rales  Chest:      Chest wall: No tenderness  Lymphadenopathy:      Cervical: No cervical adenopathy  Skin:     General: Skin is warm and dry  Capillary Refill: Capillary refill takes less than 2 seconds  Comments: Vesicular rash on the pt's right side, going in towards his groin  Following 1 dermatome  Neurological:      Mental Status: He is alert

## 2021-05-14 ENCOUNTER — APPOINTMENT (OUTPATIENT)
Dept: LAB | Facility: CLINIC | Age: 71
End: 2021-05-14
Payer: MEDICARE

## 2021-05-14 ENCOUNTER — TRANSCRIBE ORDERS (OUTPATIENT)
Dept: ADMINISTRATIVE | Facility: HOSPITAL | Age: 71
End: 2021-05-14

## 2021-05-14 DIAGNOSIS — D75.89 MACROCYTOSIS: ICD-10-CM

## 2021-05-14 DIAGNOSIS — E46 PROTEIN-CALORIE MALNUTRITION, UNSPECIFIED SEVERITY (HCC): ICD-10-CM

## 2021-05-14 DIAGNOSIS — E46 PROTEIN-CALORIE MALNUTRITION, UNSPECIFIED SEVERITY (HCC): Primary | ICD-10-CM

## 2021-05-14 LAB — VIT B12 SERPL-MCNC: 294 PG/ML (ref 100–900)

## 2021-05-14 PROCEDURE — 83918 ORGANIC ACIDS TOTAL QUANT: CPT

## 2021-05-14 PROCEDURE — 36415 COLL VENOUS BLD VENIPUNCTURE: CPT

## 2021-05-14 PROCEDURE — 82607 VITAMIN B-12: CPT

## 2021-05-26 LAB
METHYLMALONATE SERPL-SCNC: 248 NMOL/L (ref 0–378)
SL AMB DISCLAIMER: NORMAL

## 2021-10-06 ENCOUNTER — OFFICE VISIT (OUTPATIENT)
Dept: SURGERY | Facility: CLINIC | Age: 71
End: 2021-10-06
Payer: MEDICARE

## 2021-10-06 DIAGNOSIS — K21.9 GASTROESOPHAGEAL REFLUX DISEASE WITHOUT ESOPHAGITIS: Primary | ICD-10-CM

## 2021-10-06 DIAGNOSIS — K22.70 BARRETT'S ESOPHAGUS WITHOUT DYSPLASIA: ICD-10-CM

## 2021-10-06 DIAGNOSIS — K57.30 DIVERTICULAR DISEASE OF LARGE INTESTINE: ICD-10-CM

## 2021-10-06 DIAGNOSIS — E46 PROTEIN-CALORIE MALNUTRITION, UNSPECIFIED SEVERITY (HCC): ICD-10-CM

## 2021-10-06 PROCEDURE — 99213 OFFICE O/P EST LOW 20 MIN: CPT | Performed by: SURGERY

## 2021-10-06 RX ORDER — OMEPRAZOLE 20 MG/1
20 CAPSULE, DELAYED RELEASE ORAL EVERY MORNING
Qty: 90 CAPSULE | Refills: 4 | Status: SHIPPED | OUTPATIENT
Start: 2021-10-06 | End: 2022-12-30

## 2021-10-20 ENCOUNTER — HOSPITAL ENCOUNTER (EMERGENCY)
Facility: HOSPITAL | Age: 71
Discharge: HOME | End: 2021-10-20
Attending: INTERNAL MEDICINE | Admitting: INTERNAL MEDICINE
Payer: MEDICARE

## 2021-10-20 ENCOUNTER — APPOINTMENT (EMERGENCY)
Dept: RADIOLOGY | Facility: HOSPITAL | Age: 71
End: 2021-10-20
Payer: MEDICARE

## 2021-10-20 ENCOUNTER — APPOINTMENT (EMERGENCY)
Dept: CT IMAGING | Facility: HOSPITAL | Age: 71
End: 2021-10-20
Payer: MEDICARE

## 2021-10-20 VITALS
OXYGEN SATURATION: 98 % | DIASTOLIC BLOOD PRESSURE: 111 MMHG | TEMPERATURE: 98.1 F | SYSTOLIC BLOOD PRESSURE: 203 MMHG | HEART RATE: 71 BPM | RESPIRATION RATE: 18 BRPM

## 2021-10-20 DIAGNOSIS — R55 SYNCOPE: Primary | ICD-10-CM

## 2021-10-20 DIAGNOSIS — E87.1 HYPONATREMIA: ICD-10-CM

## 2021-10-20 DIAGNOSIS — E86.0 DEHYDRATION: ICD-10-CM

## 2021-10-20 LAB
ALBUMIN SERPL BCP-MCNC: 3.9 G/DL (ref 3.5–5.7)
ALP SERPL-CCNC: 41 U/L (ref 55–165)
ALT SERPL W P-5'-P-CCNC: 14 U/L (ref 7–52)
ANION GAP SERPL CALCULATED.3IONS-SCNC: 9 MMOL/L (ref 4–13)
AST SERPL W P-5'-P-CCNC: 16 U/L (ref 13–39)
BASOPHILS # BLD AUTO: 0 THOUSANDS/ΜL (ref 0–0.1)
BASOPHILS NFR BLD AUTO: 1 % (ref 0–2)
BILIRUB SERPL-MCNC: 0.5 MG/DL (ref 0.2–1)
BUN SERPL-MCNC: 13 MG/DL (ref 7–25)
CALCIUM SERPL-MCNC: 9.1 MG/DL (ref 8.6–10.5)
CHLORIDE SERPL-SCNC: 91 MMOL/L (ref 98–107)
CO2 SERPL-SCNC: 27 MMOL/L (ref 21–31)
CREAT SERPL-MCNC: 0.8 MG/DL (ref 0.7–1.3)
EOSINOPHIL # BLD AUTO: 0.2 THOUSAND/ΜL (ref 0–0.61)
EOSINOPHIL NFR BLD AUTO: 3 % (ref 0–5)
ERYTHROCYTE [DISTWIDTH] IN BLOOD BY AUTOMATED COUNT: 12.7 % (ref 11.5–14.5)
GFR SERPL CREATININE-BSD FRML MDRD: 89 ML/MIN/1.73SQ M
GLUCOSE SERPL-MCNC: 93 MG/DL (ref 65–99)
GLUCOSE SERPL-MCNC: 95 MG/DL (ref 65–140)
HCT VFR BLD AUTO: 36 % (ref 42–47)
HGB BLD-MCNC: 12.2 G/DL (ref 14–18)
LYMPHOCYTES # BLD AUTO: 0.9 THOUSANDS/ΜL (ref 0.6–4.47)
LYMPHOCYTES NFR BLD AUTO: 15 % (ref 21–51)
MCH RBC QN AUTO: 32.9 PG (ref 26–34)
MCHC RBC AUTO-ENTMCNC: 33.9 G/DL (ref 31–37)
MCV RBC AUTO: 97 FL (ref 81–99)
MONOCYTES # BLD AUTO: 0.7 THOUSAND/ΜL (ref 0.17–1.22)
MONOCYTES NFR BLD AUTO: 11 % (ref 2–12)
NEUTROPHILS # BLD AUTO: 4.6 THOUSANDS/ΜL (ref 1.4–6.5)
NEUTS SEG NFR BLD AUTO: 71 % (ref 42–75)
PLATELET # BLD AUTO: 344 THOUSANDS/UL (ref 149–390)
PMV BLD AUTO: 6.3 FL (ref 8.6–11.7)
POTASSIUM SERPL-SCNC: 4.1 MMOL/L (ref 3.5–5.5)
PROT SERPL-MCNC: 6.7 G/DL (ref 6.4–8.9)
RBC # BLD AUTO: 3.7 MILLION/UL (ref 4.3–5.9)
SODIUM SERPL-SCNC: 127 MMOL/L (ref 134–143)
TROPONIN I SERPL-MCNC: <0.03 NG/ML
WBC # BLD AUTO: 6.4 THOUSAND/UL (ref 4.8–10.8)

## 2021-10-20 PROCEDURE — 99284 EMERGENCY DEPT VISIT MOD MDM: CPT

## 2021-10-20 PROCEDURE — 80053 COMPREHEN METABOLIC PANEL: CPT | Performed by: PHYSICIAN ASSISTANT

## 2021-10-20 PROCEDURE — 36415 COLL VENOUS BLD VENIPUNCTURE: CPT | Performed by: PHYSICIAN ASSISTANT

## 2021-10-20 PROCEDURE — 70450 CT HEAD/BRAIN W/O DYE: CPT

## 2021-10-20 PROCEDURE — 82948 REAGENT STRIP/BLOOD GLUCOSE: CPT

## 2021-10-20 PROCEDURE — 71045 X-RAY EXAM CHEST 1 VIEW: CPT

## 2021-10-20 PROCEDURE — 84484 ASSAY OF TROPONIN QUANT: CPT | Performed by: PHYSICIAN ASSISTANT

## 2021-10-20 PROCEDURE — 85025 COMPLETE CBC W/AUTO DIFF WBC: CPT | Performed by: PHYSICIAN ASSISTANT

## 2021-10-20 PROCEDURE — 93005 ELECTROCARDIOGRAM TRACING: CPT

## 2021-10-20 PROCEDURE — 99284 EMERGENCY DEPT VISIT MOD MDM: CPT | Performed by: PHYSICIAN ASSISTANT

## 2021-10-20 PROCEDURE — 73130 X-RAY EXAM OF HAND: CPT

## 2021-10-20 PROCEDURE — G1004 CDSM NDSC: HCPCS

## 2021-10-20 RX ORDER — BACITRACIN, NEOMYCIN, POLYMYXIN B 400; 3.5; 5 [USP'U]/G; MG/G; [USP'U]/G
1 OINTMENT TOPICAL ONCE
Status: COMPLETED | OUTPATIENT
Start: 2021-10-20 | End: 2021-10-20

## 2021-10-20 RX ORDER — SODIUM CHLORIDE 1000 MG
1 TABLET, SOLUBLE MISCELLANEOUS 3 TIMES DAILY
Qty: 9 TABLET | Refills: 0 | Status: SHIPPED | OUTPATIENT
Start: 2021-10-20

## 2021-10-20 RX ORDER — SODIUM CHLORIDE 9 MG/ML
3 INJECTION INTRAVENOUS
Status: DISCONTINUED | OUTPATIENT
Start: 2021-10-20 | End: 2021-10-20 | Stop reason: HOSPADM

## 2021-10-20 RX ADMIN — BACITRACIN ZINC, NEOMYCIN, POLYMYXIN B 1 SMALL APPLICATION: 400; 3.5; 5 OINTMENT TOPICAL at 21:26

## 2021-10-20 RX ADMIN — SODIUM CHLORIDE 1000 ML: 0.9 INJECTION, SOLUTION INTRAVENOUS at 17:30

## 2021-10-21 LAB
ATRIAL RATE: 70 BPM
P AXIS: 57 DEGREES
PR INTERVAL: 198 MS
QRS AXIS: -24 DEGREES
QRSD INTERVAL: 108 MS
QT INTERVAL: 418 MS
QTC INTERVAL: 451 MS
T WAVE AXIS: 56 DEGREES
VENTRICULAR RATE: 70 BPM

## 2021-10-21 PROCEDURE — 93010 ELECTROCARDIOGRAM REPORT: CPT | Performed by: INTERNAL MEDICINE

## 2021-10-25 ENCOUNTER — APPOINTMENT (OUTPATIENT)
Dept: RADIOLOGY | Facility: CLINIC | Age: 71
End: 2021-10-25
Payer: MEDICARE

## 2021-10-25 ENCOUNTER — OFFICE VISIT (OUTPATIENT)
Dept: URGENT CARE | Facility: CLINIC | Age: 71
End: 2021-10-25
Payer: MEDICARE

## 2021-10-25 VITALS
BODY MASS INDEX: 31.23 KG/M2 | HEIGHT: 67 IN | OXYGEN SATURATION: 99 % | RESPIRATION RATE: 16 BRPM | DIASTOLIC BLOOD PRESSURE: 86 MMHG | WEIGHT: 199 LBS | HEART RATE: 65 BPM | SYSTOLIC BLOOD PRESSURE: 198 MMHG

## 2021-10-25 DIAGNOSIS — R93.89 ABNORMAL CHEST X-RAY: Primary | ICD-10-CM

## 2021-10-25 DIAGNOSIS — M79.671 CHRONIC FOOT PAIN, RIGHT: ICD-10-CM

## 2021-10-25 DIAGNOSIS — G89.29 CHRONIC FOOT PAIN, RIGHT: ICD-10-CM

## 2021-10-25 DIAGNOSIS — I51.7 HEART ENLARGEMENT: ICD-10-CM

## 2021-10-25 PROCEDURE — 73630 X-RAY EXAM OF FOOT: CPT

## 2021-10-25 PROCEDURE — 99213 OFFICE O/P EST LOW 20 MIN: CPT | Performed by: PHYSICIAN ASSISTANT

## 2021-10-25 PROCEDURE — G0463 HOSPITAL OUTPT CLINIC VISIT: HCPCS | Performed by: PHYSICIAN ASSISTANT

## 2021-10-25 PROCEDURE — 71046 X-RAY EXAM CHEST 2 VIEWS: CPT

## 2021-10-27 ENCOUNTER — TELEPHONE (OUTPATIENT)
Dept: URGENT CARE | Facility: CLINIC | Age: 71
End: 2021-10-27

## 2021-11-04 ENCOUNTER — HOSPITAL ENCOUNTER (OUTPATIENT)
Dept: NON INVASIVE DIAGNOSTICS | Facility: HOSPITAL | Age: 71
Discharge: HOME | End: 2021-11-04
Payer: MEDICARE

## 2021-11-04 DIAGNOSIS — I80.03 PHLEBITIS AND THROMBOPHLEBITIS OF SUPERFICIAL VESSELS OF LOWER EXTREMITIES, BILATERAL: ICD-10-CM

## 2021-11-04 PROCEDURE — 93970 EXTREMITY STUDY: CPT

## 2021-11-04 PROCEDURE — 93970 EXTREMITY STUDY: CPT | Performed by: SURGERY

## 2021-11-12 ENCOUNTER — HOSPITAL ENCOUNTER (OUTPATIENT)
Dept: CT IMAGING | Facility: HOSPITAL | Age: 71
Discharge: HOME | End: 2021-11-12
Payer: MEDICARE

## 2021-11-12 DIAGNOSIS — R05.1 ACUTE COUGH: ICD-10-CM

## 2021-11-12 DIAGNOSIS — R06.2 WHEEZING: ICD-10-CM

## 2021-11-12 PROCEDURE — G1004 CDSM NDSC: HCPCS

## 2021-11-12 PROCEDURE — 71250 CT THORAX DX C-: CPT

## 2021-11-19 ENCOUNTER — APPOINTMENT (OUTPATIENT)
Dept: LAB | Facility: CLINIC | Age: 71
End: 2021-11-19
Payer: MEDICARE

## 2021-11-19 DIAGNOSIS — C62.11 MALIGNANT NEOPLASM OF DESCENDED RIGHT TESTIS (HCC): ICD-10-CM

## 2021-11-19 LAB
AFP-TM SERPL-MCNC: 5.7 NG/ML (ref 0.5–8)
ALBUMIN SERPL BCP-MCNC: 3.5 G/DL (ref 3.5–5)
ALP SERPL-CCNC: 48 U/L (ref 46–116)
ALT SERPL W P-5'-P-CCNC: 22 U/L (ref 12–78)
ANION GAP SERPL CALCULATED.3IONS-SCNC: 7 MMOL/L (ref 4–13)
AST SERPL W P-5'-P-CCNC: 17 U/L (ref 5–45)
B-HCG SERPL-ACNC: <2 MIU/ML
BASOPHILS # BLD AUTO: 0.07 THOUSANDS/ΜL (ref 0–0.1)
BASOPHILS NFR BLD AUTO: 1 % (ref 0–1)
BILIRUB SERPL-MCNC: 0.75 MG/DL (ref 0.2–1)
BUN SERPL-MCNC: 13 MG/DL (ref 5–25)
CALCIUM SERPL-MCNC: 9.3 MG/DL (ref 8.3–10.1)
CHLORIDE SERPL-SCNC: 97 MMOL/L (ref 100–108)
CO2 SERPL-SCNC: 25 MMOL/L (ref 21–32)
CREAT SERPL-MCNC: 0.89 MG/DL (ref 0.6–1.3)
EOSINOPHIL # BLD AUTO: 0.1 THOUSAND/ΜL (ref 0–0.61)
EOSINOPHIL NFR BLD AUTO: 2 % (ref 0–6)
ERYTHROCYTE [DISTWIDTH] IN BLOOD BY AUTOMATED COUNT: 11.9 % (ref 11.6–15.1)
GFR SERPL CREATININE-BSD FRML MDRD: 85 ML/MIN/1.73SQ M
GLUCOSE P FAST SERPL-MCNC: 91 MG/DL (ref 65–99)
HCT VFR BLD AUTO: 36 % (ref 36.5–49.3)
HGB BLD-MCNC: 12.4 G/DL (ref 12–17)
IMM GRANULOCYTES # BLD AUTO: 0.03 THOUSAND/UL (ref 0–0.2)
IMM GRANULOCYTES NFR BLD AUTO: 1 % (ref 0–2)
LDH SERPL-CCNC: 192 U/L (ref 81–234)
LYMPHOCYTES # BLD AUTO: 1.23 THOUSANDS/ΜL (ref 0.6–4.47)
LYMPHOCYTES NFR BLD AUTO: 21 % (ref 14–44)
MCH RBC QN AUTO: 33 PG (ref 26.8–34.3)
MCHC RBC AUTO-ENTMCNC: 34.4 G/DL (ref 31.4–37.4)
MCV RBC AUTO: 96 FL (ref 82–98)
MONOCYTES # BLD AUTO: 0.76 THOUSAND/ΜL (ref 0.17–1.22)
MONOCYTES NFR BLD AUTO: 13 % (ref 4–12)
NEUTROPHILS # BLD AUTO: 3.63 THOUSANDS/ΜL (ref 1.85–7.62)
NEUTS SEG NFR BLD AUTO: 62 % (ref 43–75)
NRBC BLD AUTO-RTO: 0 /100 WBCS
PLATELET # BLD AUTO: 352 THOUSANDS/UL (ref 149–390)
PMV BLD AUTO: 9.4 FL (ref 8.9–12.7)
POTASSIUM SERPL-SCNC: 4 MMOL/L (ref 3.5–5.3)
PROT SERPL-MCNC: 7.2 G/DL (ref 6.4–8.2)
RBC # BLD AUTO: 3.76 MILLION/UL (ref 3.88–5.62)
SODIUM SERPL-SCNC: 129 MMOL/L (ref 136–145)
WBC # BLD AUTO: 5.82 THOUSAND/UL (ref 4.31–10.16)

## 2021-11-19 PROCEDURE — 85025 COMPLETE CBC W/AUTO DIFF WBC: CPT

## 2021-11-19 PROCEDURE — 82105 ALPHA-FETOPROTEIN SERUM: CPT

## 2021-11-19 PROCEDURE — 36415 COLL VENOUS BLD VENIPUNCTURE: CPT

## 2021-11-19 PROCEDURE — 83615 LACTATE (LD) (LDH) ENZYME: CPT

## 2021-11-19 PROCEDURE — 80053 COMPREHEN METABOLIC PANEL: CPT

## 2021-11-19 PROCEDURE — 84702 CHORIONIC GONADOTROPIN TEST: CPT

## 2021-11-19 PROCEDURE — 83918 ORGANIC ACIDS TOTAL QUANT: CPT

## 2021-11-27 LAB
METHYLMALONATE SERPL-SCNC: 230 NMOL/L (ref 0–378)
SL AMB DISCLAIMER: NORMAL

## 2021-12-13 ENCOUNTER — HOSPITAL ENCOUNTER (OUTPATIENT)
Dept: NON INVASIVE DIAGNOSTICS | Facility: HOSPITAL | Age: 71
Discharge: HOME | End: 2021-12-13
Payer: MEDICARE

## 2021-12-13 VITALS
BODY MASS INDEX: 31.23 KG/M2 | RESPIRATION RATE: 16 BRPM | WEIGHT: 199 LBS | HEIGHT: 67 IN | DIASTOLIC BLOOD PRESSURE: 80 MMHG | OXYGEN SATURATION: 98 % | SYSTOLIC BLOOD PRESSURE: 144 MMHG | HEART RATE: 70 BPM

## 2021-12-13 DIAGNOSIS — R06.02 SHORTNESS OF BREATH: ICD-10-CM

## 2021-12-13 LAB
MAX HR PERCENT: 91 %
RATE PRESSURE PRODUCT: NORMAL
SL CV STRESS RECOVERY BP: NORMAL MMHG
SL CV STRESS RECOVERY HR: 78 BPM
SL CV STRESS RECOVERY O2 SAT: 99 %
SL CV STRESS STAGE REACHED: 1
STRESS ANGINA INDEX: 0
STRESS BASELINE BP: NORMAL MMHG
STRESS BASELINE HR: 70 BPM
STRESS O2 SAT REST: 98 %
STRESS PEAK HR: 136 BPM
STRESS PERCENT HR: 91 %
STRESS POST ESTIMATED WORKLOAD: 4.6 METS
STRESS POST EXERCISE DUR MIN: 3 MIN
STRESS POST EXERCISE DUR SEC: 1 SEC
STRESS POST O2 SAT PEAK: 91 %
STRESS POST PEAK BP: 180 MMHG
STRESS TARGET HR: 136 BPM

## 2021-12-13 PROCEDURE — 93016 CV STRESS TEST SUPVJ ONLY: CPT | Performed by: INTERNAL MEDICINE

## 2021-12-13 PROCEDURE — 93018 CV STRESS TEST I&R ONLY: CPT | Performed by: INTERNAL MEDICINE

## 2021-12-13 PROCEDURE — 93017 CV STRESS TEST TRACING ONLY: CPT

## 2021-12-14 ENCOUNTER — OFFICE VISIT (OUTPATIENT)
Dept: CARDIOLOGY CLINIC | Facility: CLINIC | Age: 71
End: 2021-12-14
Payer: MEDICARE

## 2021-12-14 VITALS
WEIGHT: 213 LBS | HEIGHT: 67 IN | HEART RATE: 68 BPM | SYSTOLIC BLOOD PRESSURE: 160 MMHG | DIASTOLIC BLOOD PRESSURE: 74 MMHG | BODY MASS INDEX: 33.43 KG/M2

## 2021-12-14 DIAGNOSIS — R07.9 CHEST PAIN, UNSPECIFIED TYPE: ICD-10-CM

## 2021-12-14 DIAGNOSIS — R94.39 ABNORMAL STRESS ECG: Primary | ICD-10-CM

## 2021-12-14 DIAGNOSIS — R55 SYNCOPE, UNSPECIFIED SYNCOPE TYPE: ICD-10-CM

## 2021-12-14 LAB
ARRHY DURING EX: NORMAL
CHEST PAIN STATEMENT: NORMAL
MAX DIASTOLIC BP: 94 MMHG
MAX HEART RATE: 136 BPM
MAX PREDICTED HEART RATE: 148 BPM
MAX. SYSTOLIC BP: 184 MMHG
PROTOCOL NAME: NORMAL
TARGET HR FORMULA: NORMAL
TEST INDICATION: NORMAL
TIME IN EXERCISE PHASE: NORMAL

## 2021-12-14 PROCEDURE — 99214 OFFICE O/P EST MOD 30 MIN: CPT | Performed by: PHYSICIAN ASSISTANT

## 2021-12-17 ENCOUNTER — TELEPHONE (OUTPATIENT)
Dept: CARDIOLOGY CLINIC | Facility: CLINIC | Age: 71
End: 2021-12-17

## 2021-12-30 ENCOUNTER — HOSPITAL ENCOUNTER (OUTPATIENT)
Dept: NUCLEAR MEDICINE | Facility: HOSPITAL | Age: 71
Discharge: HOME | End: 2021-12-30
Payer: MEDICARE

## 2021-12-30 ENCOUNTER — HOSPITAL ENCOUNTER (OUTPATIENT)
Dept: NON INVASIVE DIAGNOSTICS | Facility: HOSPITAL | Age: 71
Discharge: HOME | End: 2021-12-30
Payer: MEDICARE

## 2021-12-30 VITALS
HEIGHT: 67 IN | WEIGHT: 213 LBS | SYSTOLIC BLOOD PRESSURE: 160 MMHG | BODY MASS INDEX: 33.43 KG/M2 | HEART RATE: 73 BPM | OXYGEN SATURATION: 96 % | DIASTOLIC BLOOD PRESSURE: 90 MMHG | RESPIRATION RATE: 16 BRPM

## 2021-12-30 DIAGNOSIS — R07.9 CHEST PAIN, UNSPECIFIED TYPE: ICD-10-CM

## 2021-12-30 DIAGNOSIS — R94.39 ABNORMAL STRESS ECG: ICD-10-CM

## 2021-12-30 DIAGNOSIS — R55 SYNCOPE, UNSPECIFIED SYNCOPE TYPE: ICD-10-CM

## 2021-12-30 LAB
ARRHY DURING EX: NORMAL
CHEST PAIN STATEMENT: NORMAL
MAX DIASTOLIC BP: 94 MMHG
MAX HEART RATE: 120 BPM
MAX PREDICTED HEART RATE: 148 BPM
MAX. SYSTOLIC BP: 170 MMHG
NUC STRESS EJECTION FRACTION: 49 %
PROTOCOL NAME: NORMAL
RATE PRESSURE PRODUCT: NORMAL
REASON FOR TERMINATION: NORMAL
SL CV REST NUCLEAR ISOTOPE DOSE: 10.5 MCI
SL CV STRESS NUCLEAR ISOTOPE DOSE: 32.3 MCI
SL CV STRESS RECOVERY BP: NORMAL MMHG
SL CV STRESS RECOVERY HR: 81 BPM
SL CV STRESS RECOVERY O2 SAT: 98 %
STRESS ANGINA INDEX: 0
STRESS BASELINE BP: NORMAL MMHG
STRESS BASELINE HR: 73 BPM
STRESS O2 SAT REST: 96 %
STRESS PEAK HR: 120 BPM
STRESS POST O2 SAT PEAK: 99 %
STRESS POST PEAK BP: 164 MMHG
STRESS TARGET HR: 120 BPM
TARGET HR FORMULA: NORMAL
TEST INDICATION: NORMAL
TIME IN EXERCISE PHASE: NORMAL

## 2021-12-30 PROCEDURE — 93017 CV STRESS TEST TRACING ONLY: CPT

## 2021-12-30 PROCEDURE — G1004 CDSM NDSC: HCPCS

## 2021-12-30 PROCEDURE — 93018 CV STRESS TEST I&R ONLY: CPT | Performed by: INTERNAL MEDICINE

## 2021-12-30 PROCEDURE — 78452 HT MUSCLE IMAGE SPECT MULT: CPT

## 2021-12-30 PROCEDURE — A9502 TC99M TETROFOSMIN: HCPCS

## 2021-12-30 PROCEDURE — 93016 CV STRESS TEST SUPVJ ONLY: CPT | Performed by: INTERNAL MEDICINE

## 2021-12-30 RX ADMIN — REGADENOSON 0.4 MG: 0.08 INJECTION, SOLUTION INTRAVENOUS at 08:54

## 2022-01-04 ENCOUNTER — HOSPITAL ENCOUNTER (OUTPATIENT)
Dept: NON INVASIVE DIAGNOSTICS | Facility: CLINIC | Age: 72
Discharge: HOME | End: 2022-01-04
Payer: MEDICARE

## 2022-01-04 VITALS
BODY MASS INDEX: 33.43 KG/M2 | HEART RATE: 87 BPM | WEIGHT: 212.96 LBS | SYSTOLIC BLOOD PRESSURE: 190 MMHG | HEIGHT: 67 IN | DIASTOLIC BLOOD PRESSURE: 108 MMHG

## 2022-01-04 DIAGNOSIS — R55 SYNCOPE, UNSPECIFIED SYNCOPE TYPE: ICD-10-CM

## 2022-01-04 DIAGNOSIS — R07.9 CHEST PAIN, UNSPECIFIED TYPE: ICD-10-CM

## 2022-01-04 LAB
AORTIC ROOT: 3.4 CM
APICAL FOUR CHAMBER EJECTION FRACTION: 53 %
AV LVOT MEAN GRADIENT: 2 MMHG
AV LVOT PEAK GRADIENT: 4 MMHG
AV REGURGITATION PRESSURE HALF TIME: 0.36 MS
DOP CALC LVOT AREA: 3.46 CM2
DOP CALC LVOT DIAMETER: 2.1 CM
DOP CALC LVOT PEAK VEL VTI: 21.88 CM
DOP CALC LVOT PEAK VEL: 1.03 M/S
DOP CALC LVOT STROKE INDEX: 36.1 ML/M2
DOP CALC LVOT STROKE VOLUME: 75.75 CM3
DOP CALC MV VTI: 31.49 CM
E WAVE DECELERATION TIME: 335 MS
FRACTIONAL SHORTENING: 29 % (ref 28–44)
INTERVENTRICULAR SEPTUM IN DIASTOLE (PARASTERNAL SHORT AXIS VIEW): 1.2 CM
LAAS-AP4: 23.9 CM2
LEFT INTERNAL DIMENSION IN SYSTOLE: 4 CM (ref 2.1–4)
LEFT VENTRICULAR INTERNAL DIMENSION IN DIASTOLE: 5.6 CM (ref 5.88–8.77)
LEFT VENTRICULAR POSTERIOR WALL IN END DIASTOLE: 1.1 CM
LEFT VENTRICULAR STROKE VOLUME: 83 ML
MV E'TISSUE VEL-LAT: 5 CM/S
MV E'TISSUE VEL-SEP: 7 CM/S
MV MEAN GRADIENT: 2 MMHG
MV PEAK A VEL: 1.09 M/S
MV PEAK E VEL: 57 CM/S
MV PEAK GRADIENT: 6 MMHG
MV STENOSIS PRESSURE HALF TIME: 0 MS
MV VALVE AREA BY CONTINUITY EQUATION: 2.41 CM2
RIGHT ATRIAL 2D VOLUME: 42 ML
RIGHT ATRIUM AREA SYSTOLE A4C: 17 CM2
RIGHT VENTRICLE ID DIMENSION: 2.8 CM
SL CV AV DECELERATION TIME RETROGRADE: 1255 MS
SL CV AV PEAK GRADIENT RETROGRADE: 123 MMHG
SL CV LV EF: 50
SL CV PED ECHO LEFT VENTRICLE DIASTOLIC VOLUME (MOD BIPLANE) 2D: 152 ML
SL CV PED ECHO LEFT VENTRICLE SYSTOLIC VOLUME (MOD BIPLANE) 2D: 69 ML
TRICUSPID VALVE PEAK E WAVE VELOCITY: 0.16 M/S
TRICUSPID VALVE PEAK REGURGITATION VELOCITY: 0.91 M/S
TRICUSPID VALVE S': 58 CM/S
TV PEAK GRADIENT: 3 MMHG
Z-SCORE OF LEFT VENTRICULAR DIMENSION IN END SYSTOLE: -2.49

## 2022-01-04 PROCEDURE — 93306 TTE W/DOPPLER COMPLETE: CPT

## 2022-01-04 PROCEDURE — 93306 TTE W/DOPPLER COMPLETE: CPT | Performed by: INTERNAL MEDICINE

## 2022-01-24 ENCOUNTER — CLINICAL SUPPORT (OUTPATIENT)
Dept: CARDIOLOGY CLINIC | Facility: CLINIC | Age: 72
End: 2022-01-24
Payer: MEDICARE

## 2022-01-24 DIAGNOSIS — R55 SYNCOPE, UNSPECIFIED SYNCOPE TYPE: ICD-10-CM

## 2022-01-24 PROCEDURE — 93228 REMOTE 30 DAY ECG REV/REPORT: CPT | Performed by: INTERNAL MEDICINE

## 2022-01-25 ENCOUNTER — OFFICE VISIT (OUTPATIENT)
Dept: CARDIOLOGY CLINIC | Facility: CLINIC | Age: 72
End: 2022-01-25
Payer: MEDICARE

## 2022-01-25 VITALS
DIASTOLIC BLOOD PRESSURE: 90 MMHG | SYSTOLIC BLOOD PRESSURE: 160 MMHG | BODY MASS INDEX: 32.33 KG/M2 | HEIGHT: 67 IN | HEART RATE: 66 BPM | WEIGHT: 206 LBS

## 2022-01-25 DIAGNOSIS — I10 BENIGN ESSENTIAL HTN: ICD-10-CM

## 2022-01-25 DIAGNOSIS — R55 SYNCOPE, UNSPECIFIED SYNCOPE TYPE: ICD-10-CM

## 2022-01-25 DIAGNOSIS — R94.39 ABNORMAL STRESS ECG: Primary | ICD-10-CM

## 2022-01-25 DIAGNOSIS — R07.9 CHEST PAIN, UNSPECIFIED TYPE: ICD-10-CM

## 2022-01-25 DIAGNOSIS — R06.02 SOB (SHORTNESS OF BREATH): ICD-10-CM

## 2022-01-25 PROCEDURE — 99214 OFFICE O/P EST MOD 30 MIN: CPT | Performed by: PHYSICIAN ASSISTANT

## 2022-01-25 NOTE — ASSESSMENT & PLAN NOTE
Resolved     Nonwalking nuclear stress test performed 12/30/2021 showed ejection fraction of 49% there was no evidence of prior MI for ischemia

## 2022-01-25 NOTE — ASSESSMENT & PLAN NOTE
BP has been slightly elevated   But it has been well controlled at home  He would like to follow with PCP for this and I am OK with that

## 2022-01-25 NOTE — ASSESSMENT & PLAN NOTE
There was no diagnostic evidence of ischemia on stress ECG on stress test performed 12/13/2021  There was frequent PVCs during recovery  Patient also desaturated from 98% to 91% with exertion  Nonwalking nuclear stress test performed 12/30/2021 showed ejection fraction of 49% there was no evidence of prior MI for ischemia

## 2022-01-25 NOTE — PROGRESS NOTES
[facility]   Outpatient Note  Con Deter  1950  [id number]  [facility] CARDIOLOGY [facility]  [address] [place] [id number] [phone]  Kallie is a 66 y o  male    Assessment and Plan:   Abnormal stress ECG  There was no diagnostic evidence of ischemia on stress ECG on stress test performed 12/13/2021  There was frequent PVCs during recovery  Patient also desaturated from 98% to 91% with exertion  Nonwalking nuclear stress test performed 12/30/2021 showed ejection fraction of 49% there was no evidence of prior MI for ischemia  Chest pain  Resolved     Nonwalking nuclear stress test performed 12/30/2021 showed ejection fraction of 49% there was no evidence of prior MI for ischemia  Syncope  One episode of syncope that was thought to be due to dehydration however arrhythmia has not been ruled out  One-month event monitor will be obtained to rule out any bradyarrhythmia or pauses  Two-dimensional echocardiogram shows normal LV function without  WMA    Nonwalking nuclear stress test performed 12/30/2021 showed ejection fraction of 49% there was no evidence of prior MI for ischemia  Results of the Monitor are not yet reported  I will call him with the results   If there is any concerning arrhythmia we will set up follow up accordingly  Otherwise follow up will be on an as needed basis as this is likely due to dehydration  Benign essential HTN  BP has been slightly elevated   But it has been well controlled at home  He would like to follow with PCP for this and I am OK with that  Additional Plan:   Medications as detailed above  Pertinent testing orders as outlined  Available lab and test results are reviewed with the patient and any additional required labs are ordered as noted  Return visit will be in one month or earlier if there are problems       The patient is encouraged to call in the meantime if there are questions or concerns  Subjective: The patient is seen in the office today after having an abnormal stress test yesterday that showed no diagnostic evidence of ischemia on ECG portion of the stress test however there was frequent PVCs during recovery and the patient desaturated from 98% to 91% with exercise  The stress test was obtained because patient was seen in the emergency room approximately 2 months ago for a syncopal episode that occurred at his home in his garage  He states that he was sitting down drinking a beer and suddenly he dropped to the ground and hit his head and hurt his arm  He dragged himself to the door where he called for his wife for help  She called the emergency team and he was taking to the hospital by ambulance  It was there that it was determined that he had been dehydrated  No arrhythmia was noted at that time  He has been concerned since then and would like a more thorough workup  He states that he does get chest discomfort after of heavy meal   It is a burning sensation in the chest   It is not associated with shortness of breath or palpitations  He has had no recurrence of syncope  He has no TIA or CVA symptoms  He denies any edema orthopnea or PND  He has no nausea or diaphoresis  Nuclear stress test did not show any prior MI or ischemia ejection fraction was 49%  Echocardiogram showed preserved left ventricular function without wall motion abnormality  Event monitor results are pending  He has had no recurrence of symptoms since his last visit  Without any chest pain or shortness of breath  He is currently being worked up for a new lung nodule  Heart Problem  Associated symptoms include chest pain  Pertinent negatives include no coughing         Social History  Social History     Tobacco Use   Smoking Status Never Smoker   Smokeless Tobacco Former User    Types: Chew   ,   Social History     Substance and Sexual Activity   Alcohol Use Yes   ,   Social History     Substance and Sexual Activity   Drug Use Never     Family History   Problem Relation Age of Onset    Cancer Mother     Cancer Father     Thyroid cancer Brother     Tongue cancer Brother        Medical and Surgical History  Past Medical History:   Diagnosis Date    Arthritis     generalized    Roque's esophagus without dysplasia 6/19/2019    Cancer [facility])     testicular cancer and lung 2012  (had chemo 2012 and clear since)    Chronic pain disorder     herniated disc in lumbar area; monthly visit to chiropractor    Diverticulosis 6/19/2019    Gastroesophageal reflux disease without esophagitis 2/25/2019    Gout     History of colon polyps 6/19/2019    Kidney stone 2003? no problem since then    SOB (shortness of breath)     Syncope      Past Surgical History:   Procedure Laterality Date    COLONOSCOPY  2016    CYSTOSCOPY W/ LASER LITHOTRIPSY  2003?  EGD  2015    gabriele    HERNIA REPAIR Left     x3 surgery    LUNG REMOVAL, PARTIAL Right 2012    right middle    SKIN BIOPSY      TESTICLE SURGERY Right 2010    removal         Current Outpatient Medications:     LOPEZ PO, Take 1 capsule by mouth every morning, Disp: , Rfl:     omeprazole (PriLOSEC) 20 mg delayed release capsule, Take 1 capsule (20 mg total) by mouth every morning, Disp: 90 capsule, Rfl: 4    sodium chloride 1 g tablet, Take 1 tablet (1 g total) by mouth 3 (three) times a day (Patient not taking: Reported on 12/13/2021 ), Disp: 9 tablet, Rfl: 0    Suprep Bowel Prep Kit 17 5-3 13-1 6 GM/177ML SOLN, Take 1 kit by mouth once for 1 dose (Patient not taking: Reported on 1/25/2022 ), Disp: 1 Bottle, Rfl: 0    valACYclovir (VALTREX) 1,000 mg tablet, Take 1 tablet (1,000 mg total) by mouth 3 (three) times a day for 7 days (Patient not taking: Reported on 1/25/2022 ), Disp: 21 tablet, Rfl: 0  No Known Allergies    Review of Systems   Constitutional: Negative     HENT: Negative  Eyes: Negative  Cardiovascular: Positive for chest pain  Negative for claudication, cyanosis, dyspnea on exertion, irregular heartbeat, leg swelling, near-syncope, orthopnea, palpitations and paroxysmal nocturnal dyspnea  Respiratory: Negative  Negative for cough, hemoptysis, shortness of breath, sleep disturbances due to breathing, snoring, sputum production and wheezing  Endocrine: Negative  Hematologic/Lymphatic: Negative  Skin: Negative  Musculoskeletal: Negative  Gastrointestinal: Negative  Genitourinary: Negative  Psychiatric/Behavioral: Negative  Allergic/Immunologic: Negative  Objective:   /90   Pulse 66   Ht 5' 7" (1 702 m)   Wt 93 4 kg (206 lb)   BMI 32 26 kg/m²   Physical Exam  Vitals and nursing note reviewed  Constitutional:       Appearance: He is well-developed  He is obese  HENT:      Head: Normocephalic and atraumatic  Mouth/Throat:      Mouth: Mucous membranes are moist    Eyes:      General: No scleral icterus  Conjunctiva/sclera: Conjunctivae normal    Neck:      Thyroid: No thyromegaly  Vascular: No JVD  Cardiovascular:      Rate and Rhythm: Normal rate and regular rhythm  Heart sounds: Normal heart sounds, S1 normal and S2 normal  No murmur heard  No friction rub  No gallop  Pulmonary:      Effort: No respiratory distress  Breath sounds: No wheezing or rales  Abdominal:      General: Bowel sounds are normal  There is no distension  Palpations: Abdomen is soft  Tenderness: There is no abdominal tenderness  Comments: Aorta not palpable   Musculoskeletal:         General: No tenderness or deformity  Normal range of motion  Cervical back: Normal range of motion and neck supple  Right lower leg: No edema  Left lower leg: No edema  Skin:     General: Skin is warm and dry  Neurological:      General: No focal deficit present        Mental Status: He is alert and oriented to person, place, and time  Psychiatric:         Mood and Affect: Mood normal          Behavior: Behavior normal          Judgment: Judgment normal          Lab Review:   No results found for: CHOL  No results found for: HDL  No results found for: LDLCALC  No results found for: TRIG  Results Reviewed     None        Results Reviewed     None        Results Reviewed     None          Recent Cardiovascular Testing:   EST 12/13/2021 no diagnostic evidence of ischemia on stress EKG  There was frequent PVCs during recovery, also the patient desaturated with exertion from 98% to 91%  Echo 01/04/2022:  Normal LVEF 50% mild LAD mild MR    Lexiscan 12/30/2021 LVEF 49% no prior MI no ischemia      ECG Review:   10/20/2021:Normal sinus rhythm  Nonspecific ST abnormality

## 2022-01-25 NOTE — PATIENT INSTRUCTIONS
I will call you with the results of the monitor     Otherwise we will see you on an as needed basis

## 2022-01-25 NOTE — ASSESSMENT & PLAN NOTE
One episode of syncope that was thought to be due to dehydration however arrhythmia has not been ruled out  One-month event monitor will be obtained to rule out any bradyarrhythmia or pauses  Two-dimensional echocardiogram shows normal LV function without  WMA    Nonwalking nuclear stress test performed 12/30/2021 showed ejection fraction of 49% there was no evidence of prior MI for ischemia  Results of the Monitor are not yet reported  I will call him with the results   If there is any concerning arrhythmia we will set up follow up accordingly  Otherwise follow up will be on an as needed basis as this is likely due to dehydration

## 2022-01-26 NOTE — RESULT ENCOUNTER NOTE
· 9 events were transmitted   0 patient triggered; 9 auto triggered  · Patient monitored for 4d 0h 2m  · VT occurred 1 time(s) with fastest run 111 BPM  4 beats  · 492 PACs with PAC burden of <1%  · 1,699 PVCs with PVC burden of 1%      Gloria Fletcher MD

## 2022-02-14 ENCOUNTER — HOSPITAL ENCOUNTER (OUTPATIENT)
Dept: CT IMAGING | Facility: HOSPITAL | Age: 72
Discharge: HOME | End: 2022-02-14
Payer: MEDICARE

## 2022-02-14 DIAGNOSIS — R91.1 SOLITARY PULMONARY NODULE: ICD-10-CM

## 2022-02-14 PROCEDURE — G1004 CDSM NDSC: HCPCS

## 2022-02-14 PROCEDURE — 71260 CT THORAX DX C+: CPT

## 2022-02-14 PROCEDURE — 74177 CT ABD & PELVIS W/CONTRAST: CPT

## 2022-02-14 RX ADMIN — IOHEXOL 100 ML: 350 INJECTION, SOLUTION INTRAVENOUS at 07:28

## 2022-03-21 ENCOUNTER — OFFICE VISIT (OUTPATIENT)
Dept: URGENT CARE | Facility: CLINIC | Age: 72
End: 2022-03-21
Payer: MEDICARE

## 2022-03-21 VITALS
HEIGHT: 67 IN | TEMPERATURE: 98.1 F | OXYGEN SATURATION: 99 % | HEART RATE: 62 BPM | BODY MASS INDEX: 32.33 KG/M2 | DIASTOLIC BLOOD PRESSURE: 94 MMHG | RESPIRATION RATE: 20 BRPM | WEIGHT: 206 LBS | SYSTOLIC BLOOD PRESSURE: 148 MMHG

## 2022-03-21 DIAGNOSIS — R21 RASH: Primary | ICD-10-CM

## 2022-03-21 PROCEDURE — G0463 HOSPITAL OUTPT CLINIC VISIT: HCPCS | Performed by: PREVENTIVE MEDICINE

## 2022-03-21 PROCEDURE — 99213 OFFICE O/P EST LOW 20 MIN: CPT | Performed by: PREVENTIVE MEDICINE

## 2022-03-21 NOTE — PATIENT INSTRUCTIONS
Basal Cell Carcinoma   AMBULATORY CARE:   Basal cell carcinoma (BCC)  is a common skin cancer that begins in the top layer of the skin  BCC grows slowly and rarely spreads  Exposure to ultraviolet (UV) rays in sunlight or tanning beds increases your risk for BCC  Multiple sunburns or a lot of UV exposure when you were a child also increases the risk  Your risk is also higher if you are over 40 years, have light skin, or have other skin cancers or past radiation treatment  Common signs of BCC:  BCC usually forms on skin that has been exposed to the sun  Tumors on the head, ears, nose, and neck are common  Most tumors are not painful  The following are common signs of a BCC:  · Shiny, waxy, pale or pink bumps or growths that may have blood vessels on the surface    · Red, scaly patches    · Open sores that may bleed and do not heal       Call your doctor or dermatologist if:   · You have a fever  · You have a sore that has not healed within 2 months  · You see new growths on your skin  · The size, shape, or color of a mole or freckle has changed  · You have questions or concerns about your condition or care  Prevent another BCC:       · Do a body check 1 time each month  Look for new growths or sores  Check for changes in the size, shape, or color of your moles and freckles  Look for sores that do not heal  Use a mirror to check places that are hard to see  Ask family members or friends to help  · Protect your skin from UV light  Avoid the sun between 10 am and 4 pm, when the rays are strongest  If you are outside, apply sunscreen with a sun protection factor (SPF) of 30 or more every 2 hours  Do this even on cloudy days  Wear pants and long sleeves to cover your body  Hats with a wide brim can protect your face, head, and neck  Wear sunglasses that block 99% of UV rays  Avoid tanning beds      Follow up with your doctor or dermatologist as directed:  Write down your questions so you remember to ask them during your visits  Ask your healthcare provider how often you need to be checked  For support and more information:   · [address]  [address]  [place]  [facility]  Phone: [phone]  Web Address: http://[email or web address]/  [employer]    · The [address]  [address] [address], [address] , [address]  Phone: [phone]  Web Address: Hakeem Hendrickson  [address] [place] [address] 2022 Information is for End User's use only and may not be sold, redistributed or otherwise used for commercial purposes  All illustrations and images included in CareNotes® are the copyrighted property of A D A M , Inc  or [facility] Christiane Fontanez  The above information is an  only  It is not intended as medical advice for individual conditions or treatments  Talk to your doctor, nurse or pharmacist before following any medical regimen to see if it is safe and effective for you

## 2022-03-28 NOTE — PROGRESS NOTES
[facility]        NAME: Yecenia Grace is a 66 y o  male  : 1950    MRN: [id number]  DATE: 2022  TIME: 9:36 AM    Assessment and Plan   Rash [R21]  1  Rash  Ambulatory Referral to Dermatology         Patient Instructions       Follow up with PCP in 3-5 days  Proceed to  ER if symptoms worsen  Chief Complaint     Chief Complaint   Patient presents with    Facial Pain     face swollen for about 1 week right side of face  around the nose         History of Present Illness       Rash  This is a new problem  The current episode started in the past 7 days  The problem is unchanged  The affected locations include the face  The rash is characterized by redness, peeling and scaling  He was exposed to nothing  Past treatments include nothing  The treatment provided no relief  Review of Systems   Review of Systems   Constitutional: Negative  Respiratory: Negative  Cardiovascular: Negative  Skin: Positive for rash  All other systems reviewed and are negative          Current Medications       Current Outpatient Medications:     LOPEZ PO, Take 1 capsule by mouth every morning, Disp: , Rfl:     omeprazole (PriLOSEC) 20 mg delayed release capsule, Take 1 capsule (20 mg total) by mouth every morning, Disp: 90 capsule, Rfl: 4    sodium chloride 1 g tablet, Take 1 tablet (1 g total) by mouth 3 (three) times a day (Patient not taking: Reported on 2021 ), Disp: 9 tablet, Rfl: 0    Suprep Bowel Prep Kit 17 5-3 13-1 6 GM/177ML SOLN, Take 1 kit by mouth once for 1 dose (Patient not taking: Reported on 2022 ), Disp: 1 Bottle, Rfl: 0    valACYclovir (VALTREX) 1,000 mg tablet, Take 1 tablet (1,000 mg total) by mouth 3 (three) times a day for 7 days (Patient not taking: Reported on 2022 ), Disp: 21 tablet, Rfl: 0    Current Allergies     Allergies as of 2022    (No Known Allergies)            The following portions of the patient's history were reviewed and updated as appropriate: allergies, current medications, past family history, past medical history, past social history, past surgical history and problem list      Past Medical History:   Diagnosis Date    Arthritis     generalized    Roque's esophagus without dysplasia 6/19/2019    Cancer [facility])     testicular cancer and lung 2012  (had chemo 2012 and clear since)    Chronic pain disorder     herniated disc in lumbar area; monthly visit to chiropractor    Diverticulosis 6/19/2019    Gastroesophageal reflux disease without esophagitis 2/25/2019    Gout     History of colon polyps 6/19/2019    Kidney stone 2003? no problem since then    SOB (shortness of breath)     Syncope        Past Surgical History:   Procedure Laterality Date    COLONOSCOPY  2016    CYSTOSCOPY W/ LASER LITHOTRIPSY  2003?  EGD  2015    gabriele    HERNIA REPAIR Left     x3 surgery    LUNG REMOVAL, PARTIAL Right 2012    right middle    SKIN BIOPSY      TESTICLE SURGERY Right 2010    removal       Family History   Problem Relation Age of Onset    Cancer Mother     Cancer Father     Thyroid cancer Brother     Tongue cancer Brother          Medications have been verified  Objective   /94   Pulse 62   Temp 98 1 °F (36 7 °C)   Resp 20   Ht 5' 7" (1 702 m)   Wt 93 4 kg (206 lb)   SpO2 99%   BMI 32 26 kg/m²        Physical Exam     Physical Exam  Vitals and nursing note reviewed  Constitutional:       Appearance: He is well-developed  Cardiovascular:      Rate and Rhythm: Normal rate and regular rhythm  Pulmonary:      Effort: Pulmonary effort is normal       Breath sounds: Normal breath sounds  Abdominal:      General: Bowel sounds are normal       Palpations: Abdomen is soft  Skin:     General: Skin is warm  Capillary Refill: Capillary refill takes less than 2 seconds  Findings: Rash present  Rash is macular and scaling

## 2022-06-15 ENCOUNTER — APPOINTMENT (OUTPATIENT)
Dept: LAB | Facility: HOSPITAL | Age: 72
End: 2022-06-15
Payer: MEDICARE

## 2022-06-15 ENCOUNTER — HOSPITAL ENCOUNTER (OUTPATIENT)
Dept: CT IMAGING | Facility: HOSPITAL | Age: 72
Discharge: HOME | End: 2022-06-15
Payer: MEDICARE

## 2022-06-15 DIAGNOSIS — R91.1 LUNG NODULE: ICD-10-CM

## 2022-06-15 DIAGNOSIS — C62.11 MALIGNANT NEOPLASM OF RIGHT DESCENDED TESTIS (HCC): ICD-10-CM

## 2022-06-15 LAB
AFP-TM SERPL-MCNC: 5.3 NG/ML (ref 0.5–8)
ALBUMIN SERPL BCP-MCNC: 4 G/DL (ref 3.5–5)
ALP SERPL-CCNC: 42 U/L (ref 34–104)
ALT SERPL W P-5'-P-CCNC: 11 U/L (ref 7–52)
ANION GAP SERPL CALCULATED.3IONS-SCNC: 8 MMOL/L (ref 4–13)
AST SERPL W P-5'-P-CCNC: 13 U/L (ref 13–39)
B-HCG SERPL-ACNC: 1 MIU/ML (ref 0–0.6)
BASOPHILS # BLD AUTO: 0.07 THOUSANDS/ΜL (ref 0–0.1)
BASOPHILS NFR BLD AUTO: 1 % (ref 0–1)
BILIRUB SERPL-MCNC: 0.77 MG/DL (ref 0.2–1)
BUN SERPL-MCNC: 14 MG/DL (ref 5–25)
CALCIUM SERPL-MCNC: 9 MG/DL (ref 8.4–10.2)
CHLORIDE SERPL-SCNC: 95 MMOL/L (ref 96–108)
CO2 SERPL-SCNC: 27 MMOL/L (ref 21–32)
CREAT SERPL-MCNC: 0.99 MG/DL (ref 0.6–1.3)
EOSINOPHIL # BLD AUTO: 0.2 THOUSAND/ΜL (ref 0–0.61)
EOSINOPHIL NFR BLD AUTO: 3 % (ref 0–6)
ERYTHROCYTE [DISTWIDTH] IN BLOOD BY AUTOMATED COUNT: 12.3 % (ref 11.6–15.1)
GFR SERPL CREATININE-BSD FRML MDRD: 75 ML/MIN/1.73SQ M
GLUCOSE P FAST SERPL-MCNC: 92 MG/DL (ref 65–99)
HCT VFR BLD AUTO: 35.8 % (ref 36.5–49.3)
HGB BLD-MCNC: 12.1 G/DL (ref 12–17)
IMM GRANULOCYTES # BLD AUTO: 0.02 THOUSAND/UL (ref 0–0.2)
IMM GRANULOCYTES NFR BLD AUTO: 0 % (ref 0–2)
LDH SERPL-CCNC: 197 U/L (ref 140–271)
LYMPHOCYTES # BLD AUTO: 1.55 THOUSANDS/ΜL (ref 0.6–4.47)
LYMPHOCYTES NFR BLD AUTO: 26 % (ref 14–44)
MCH RBC QN AUTO: 33.2 PG (ref 26.8–34.3)
MCHC RBC AUTO-ENTMCNC: 33.8 G/DL (ref 31.4–37.4)
MCV RBC AUTO: 98 FL (ref 82–98)
MONOCYTES # BLD AUTO: 0.94 THOUSAND/ΜL (ref 0.17–1.22)
MONOCYTES NFR BLD AUTO: 16 % (ref 4–12)
NEUTROPHILS # BLD AUTO: 3.14 THOUSANDS/ΜL (ref 1.85–7.62)
NEUTS SEG NFR BLD AUTO: 54 % (ref 43–75)
NRBC BLD AUTO-RTO: 0 /100 WBCS
PLATELET # BLD AUTO: 356 THOUSANDS/UL (ref 149–390)
PMV BLD AUTO: 9.1 FL (ref 8.9–12.7)
POTASSIUM SERPL-SCNC: 4 MMOL/L (ref 3.5–5.3)
PROT SERPL-MCNC: 6.5 G/DL (ref 6.4–8.4)
RBC # BLD AUTO: 3.65 MILLION/UL (ref 3.88–5.62)
SODIUM SERPL-SCNC: 130 MMOL/L (ref 135–147)
WBC # BLD AUTO: 5.92 THOUSAND/UL (ref 4.31–10.16)

## 2022-06-15 PROCEDURE — G1004 CDSM NDSC: HCPCS

## 2022-06-15 PROCEDURE — 85025 COMPLETE CBC W/AUTO DIFF WBC: CPT

## 2022-06-15 PROCEDURE — 71250 CT THORAX DX C-: CPT

## 2022-06-15 PROCEDURE — 80053 COMPREHEN METABOLIC PANEL: CPT

## 2022-06-15 PROCEDURE — 82105 ALPHA-FETOPROTEIN SERUM: CPT

## 2022-06-15 PROCEDURE — 83615 LACTATE (LD) (LDH) ENZYME: CPT

## 2022-06-15 PROCEDURE — 84702 CHORIONIC GONADOTROPIN TEST: CPT

## 2022-06-15 PROCEDURE — 36415 COLL VENOUS BLD VENIPUNCTURE: CPT

## 2022-08-08 ENCOUNTER — TELEPHONE (OUTPATIENT)
Dept: SURGERY | Facility: CLINIC | Age: 72
End: 2022-08-08

## 2022-08-08 NOTE — TELEPHONE ENCOUNTER
----- Message from Ariadne Brunner sent at 8/13/2019  1:13 PM EDT -----  Regarding: 3 year recall  Pre op colonoscopy

## 2022-10-05 ENCOUNTER — OFFICE VISIT (OUTPATIENT)
Dept: SURGERY | Facility: CLINIC | Age: 72
End: 2022-10-05
Payer: MEDICARE

## 2022-10-05 VITALS
RESPIRATION RATE: 18 BRPM | BODY MASS INDEX: 32.96 KG/M2 | HEIGHT: 67 IN | WEIGHT: 210 LBS | SYSTOLIC BLOOD PRESSURE: 160 MMHG | OXYGEN SATURATION: 98 % | HEART RATE: 65 BPM | DIASTOLIC BLOOD PRESSURE: 86 MMHG | TEMPERATURE: 98.3 F

## 2022-10-05 DIAGNOSIS — K42.9 UMBILICAL HERNIA WITHOUT OBSTRUCTION AND WITHOUT GANGRENE: ICD-10-CM

## 2022-10-05 DIAGNOSIS — E46 PROTEIN-CALORIE MALNUTRITION, UNSPECIFIED SEVERITY (HCC): ICD-10-CM

## 2022-10-05 DIAGNOSIS — K22.70 BARRETT'S ESOPHAGUS WITHOUT DYSPLASIA: Primary | ICD-10-CM

## 2022-10-05 PROBLEM — Z85.47 HISTORY OF TESTICULAR CANCER: Status: ACTIVE | Noted: 2017-04-18

## 2022-10-05 PROCEDURE — 99214 OFFICE O/P EST MOD 30 MIN: CPT | Performed by: SURGERY

## 2022-10-05 NOTE — H&P
Assessment/Plan:    Roque's esophagus without dysplasia  Patient is a pleasant 60-year-old male with a history of Roque's esophagus for which repeat upper endoscopy is now indicated  Patient takes daily omeprazole with good effect  He denies all complaints referable to his upper abdomen  On physical examination he is well-appearing male no acute distress awake alert oriented  Competent reliable as a historian  His abdomen is soft and nontender  He does have reducible umbilical hernia  Patient has a history for Roque's esophagus  I have advised repeat EGD at this time  Technical details of the procedures well as the benefits and risks reviewed informed written consent obtained to proceed  Umbilical hernia without obstruction and without gangrene  Patient has an umbilical hernia that is reducible  Patient denies all symptoms referable to in has expressed no interest in an elective repair at this time citing his ongoing need for to render care for his disabled wife  Subjective:      Patient ID: Laney Montelongo is a 67 y o  male  Patient came in today for 1 year f/u on Roque's esophagus and GERD symptoms  (CT Scan 02/14/22) and possible pre for colonoscopy  Patient states he is doing well  Patient has no complaints of diarrhea/constipation, bloody stools, nausea/vomiting, trouble eating, abd pain or fevers/chills  Maday REYES MA         The following portions of the patient's history were reviewed and updated as appropriate: allergies, current medications, past family history, past medical history, past social history, past surgical history and problem list     Review of Systems   Constitutional: Negative for chills and fever  HENT: Negative for ear pain and sore throat  Eyes: Negative for pain and visual disturbance  Respiratory: Negative for cough and shortness of breath  Cardiovascular: Negative for chest pain and palpitations     Gastrointestinal: Negative for abdominal pain and vomiting  Genitourinary: Negative for dysuria and hematuria  Musculoskeletal: Negative for arthralgias and back pain  Skin: Negative for color change and rash  Neurological: Negative for seizures and syncope  All other systems reviewed and are negative  Objective:      /86 (BP Location: Left arm, Patient Position: Sitting, Cuff Size: Standard)   Pulse 65   Temp 98 3 °F (36 8 °C) (Temporal)   Resp 18   Ht 5' 7" (1 702 m)   Wt 95 3 kg (210 lb)   SpO2 98%   BMI 32 89 kg/m²          Physical Exam  Vitals and nursing note reviewed  Constitutional:       Appearance: He is well-developed  HENT:      Head: Normocephalic and atraumatic  Eyes:      Conjunctiva/sclera: Conjunctivae normal       Pupils: Pupils are equal, round, and reactive to light  Cardiovascular:      Rate and Rhythm: Normal rate and regular rhythm  Pulmonary:      Effort: Pulmonary effort is normal       Breath sounds: Normal breath sounds  Abdominal:      General: Bowel sounds are normal       Palpations: Abdomen is soft  Comments: Abdomen soft  Reducible umbilical hernia present  No hernias appreciated elsewhere on the abdominal wall  Musculoskeletal:         General: Normal range of motion  Cervical back: Normal range of motion and neck supple  Skin:     General: Skin is warm and dry  Neurological:      Mental Status: He is alert and oriented to person, place, and time  Psychiatric:         Behavior: Behavior normal          Thought Content:  Thought content normal          Judgment: Judgment normal

## 2022-10-05 NOTE — ASSESSMENT & PLAN NOTE
Patient is a pleasant 58-year-old male with a history of Roque's esophagus for which repeat upper endoscopy is now indicated  Patient takes daily omeprazole with good effect  He denies all complaints referable to his upper abdomen  On physical examination he is well-appearing male no acute distress awake alert oriented  Competent reliable as a historian  His abdomen is soft and nontender  He does have reducible umbilical hernia  Patient has a history for Roque's esophagus  I have advised repeat EGD at this time  Technical details of the procedures well as the benefits and risks reviewed informed written consent obtained to proceed

## 2022-10-05 NOTE — ASSESSMENT & PLAN NOTE
Patient has an umbilical hernia that is reducible  Patient denies all symptoms referable to in has expressed no interest in an elective repair at this time citing his ongoing need for to render care for his disabled wife

## 2022-10-05 NOTE — PROGRESS NOTES
Assessment/Plan:    Roque's esophagus without dysplasia  Patient is a pleasant 62-year-old male with a history of Roque's esophagus for which repeat upper endoscopy is now indicated  Patient takes daily omeprazole with good effect  He denies all complaints referable to his upper abdomen  On physical examination he is well-appearing male no acute distress awake alert oriented  Competent reliable as a historian  His abdomen is soft and nontender  He does have reducible umbilical hernia  Patient has a history for Roque's esophagus  I have advised repeat EGD at this time  Technical details of the procedures well as the benefits and risks reviewed informed written consent obtained to proceed  Umbilical hernia without obstruction and without gangrene  Patient has an umbilical hernia that is reducible  Patient denies all symptoms referable to in has expressed no interest in an elective repair at this time citing his ongoing need for to render care for his disabled wife  Subjective:      Patient ID: Brenna Uribe is a 67 y o  male  Patient came in today for 1 year f/u on Roque's esophagus and GERD symptoms  (CT Scan 02/14/22) and possible pre for colonoscopy  Patient states he is doing well  Patient has no complaints of diarrhea/constipation, bloody stools, nausea/vomiting, trouble eating, abd pain or fevers/chills  Maday REYES,MA         The following portions of the patient's history were reviewed and updated as appropriate: allergies, current medications, past family history, past medical history, past social history, past surgical history and problem list     Review of Systems   Constitutional: Negative for chills and fever  HENT: Negative for ear pain and sore throat  Eyes: Negative for pain and visual disturbance  Respiratory: Negative for cough and shortness of breath  Cardiovascular: Negative for chest pain and palpitations     Gastrointestinal: Negative for abdominal pain and vomiting  Genitourinary: Negative for dysuria and hematuria  Musculoskeletal: Negative for arthralgias and back pain  Skin: Negative for color change and rash  Neurological: Negative for seizures and syncope  All other systems reviewed and are negative  Objective:      /86 (BP Location: Left arm, Patient Position: Sitting, Cuff Size: Standard)   Pulse 65   Temp 98 3 °F (36 8 °C) (Temporal)   Resp 18   Ht 5' 7" (1 702 m)   Wt 95 3 kg (210 lb)   SpO2 98%   BMI 32 89 kg/m²          Physical Exam  Vitals and nursing note reviewed  Constitutional:       Appearance: He is well-developed  HENT:      Head: Normocephalic and atraumatic  Eyes:      Conjunctiva/sclera: Conjunctivae normal       Pupils: Pupils are equal, round, and reactive to light  Cardiovascular:      Rate and Rhythm: Normal rate and regular rhythm  Pulmonary:      Effort: Pulmonary effort is normal       Breath sounds: Normal breath sounds  Abdominal:      General: Bowel sounds are normal       Palpations: Abdomen is soft  Comments: Abdomen soft  Reducible umbilical hernia present  No hernias appreciated elsewhere on the abdominal wall  Musculoskeletal:         General: Normal range of motion  Cervical back: Normal range of motion and neck supple  Skin:     General: Skin is warm and dry  Neurological:      Mental Status: He is alert and oriented to person, place, and time  Psychiatric:         Behavior: Behavior normal          Thought Content:  Thought content normal          Judgment: Judgment normal

## 2022-10-05 NOTE — H&P (VIEW-ONLY)
Assessment/Plan:    Roque's esophagus without dysplasia  Patient is a pleasant 67-year-old male with a history of Roque's esophagus for which repeat upper endoscopy is now indicated  Patient takes daily omeprazole with good effect  He denies all complaints referable to his upper abdomen  On physical examination he is well-appearing male no acute distress awake alert oriented  Competent reliable as a historian  His abdomen is soft and nontender  He does have reducible umbilical hernia  Patient has a history for Roque's esophagus  I have advised repeat EGD at this time  Technical details of the procedures well as the benefits and risks reviewed informed written consent obtained to proceed  Umbilical hernia without obstruction and without gangrene  Patient has an umbilical hernia that is reducible  Patient denies all symptoms referable to in has expressed no interest in an elective repair at this time citing his ongoing need for to render care for his disabled wife  Subjective:      Patient ID: Jorge Giles is a 67 y o  male  Patient came in today for 1 year f/u on Roque's esophagus and GERD symptoms  (CT Scan 02/14/22) and possible pre for colonoscopy  Patient states he is doing well  Patient has no complaints of diarrhea/constipation, bloody stools, nausea/vomiting, trouble eating, abd pain or fevers/chills  Maday REYES MA         The following portions of the patient's history were reviewed and updated as appropriate: allergies, current medications, past family history, past medical history, past social history, past surgical history and problem list     Review of Systems   Constitutional: Negative for chills and fever  HENT: Negative for ear pain and sore throat  Eyes: Negative for pain and visual disturbance  Respiratory: Negative for cough and shortness of breath  Cardiovascular: Negative for chest pain and palpitations     Gastrointestinal: Negative for abdominal pain and vomiting  Genitourinary: Negative for dysuria and hematuria  Musculoskeletal: Negative for arthralgias and back pain  Skin: Negative for color change and rash  Neurological: Negative for seizures and syncope  All other systems reviewed and are negative  Objective:      /86 (BP Location: Left arm, Patient Position: Sitting, Cuff Size: Standard)   Pulse 65   Temp 98 3 °F (36 8 °C) (Temporal)   Resp 18   Ht 5' 7" (1 702 m)   Wt 95 3 kg (210 lb)   SpO2 98%   BMI 32 89 kg/m²          Physical Exam  Vitals and nursing note reviewed  Constitutional:       Appearance: He is well-developed  HENT:      Head: Normocephalic and atraumatic  Eyes:      Conjunctiva/sclera: Conjunctivae normal       Pupils: Pupils are equal, round, and reactive to light  Cardiovascular:      Rate and Rhythm: Normal rate and regular rhythm  Pulmonary:      Effort: Pulmonary effort is normal       Breath sounds: Normal breath sounds  Abdominal:      General: Bowel sounds are normal       Palpations: Abdomen is soft  Comments: Abdomen soft  Reducible umbilical hernia present  No hernias appreciated elsewhere on the abdominal wall  Musculoskeletal:         General: Normal range of motion  Cervical back: Normal range of motion and neck supple  Skin:     General: Skin is warm and dry  Neurological:      Mental Status: He is alert and oriented to person, place, and time  Psychiatric:         Behavior: Behavior normal          Thought Content:  Thought content normal          Judgment: Judgment normal

## 2022-11-01 ENCOUNTER — HOSPITAL ENCOUNTER (OUTPATIENT)
Dept: PERIOP | Facility: HOSPITAL | Age: 72
Setting detail: OUTPATIENT SURGERY
Discharge: HOME | End: 2022-11-01
Attending: SURGERY

## 2022-11-01 ENCOUNTER — ANESTHESIA (OUTPATIENT)
Dept: PERIOP | Facility: HOSPITAL | Age: 72
End: 2022-11-01

## 2022-11-01 ENCOUNTER — ANESTHESIA EVENT (OUTPATIENT)
Dept: PERIOP | Facility: HOSPITAL | Age: 72
End: 2022-11-01

## 2022-11-01 VITALS
BODY MASS INDEX: 32.96 KG/M2 | WEIGHT: 210 LBS | OXYGEN SATURATION: 96 % | HEIGHT: 67 IN | DIASTOLIC BLOOD PRESSURE: 90 MMHG | TEMPERATURE: 98.6 F | SYSTOLIC BLOOD PRESSURE: 158 MMHG | RESPIRATION RATE: 26 BRPM | HEART RATE: 80 BPM

## 2022-11-01 DIAGNOSIS — K22.70 BARRETT'S ESOPHAGUS WITHOUT DYSPLASIA: ICD-10-CM

## 2022-11-01 RX ORDER — LIDOCAINE HYDROCHLORIDE 20 MG/ML
INJECTION, SOLUTION EPIDURAL; INFILTRATION; INTRACAUDAL; PERINEURAL AS NEEDED
Status: DISCONTINUED | OUTPATIENT
Start: 2022-11-01 | End: 2022-11-01

## 2022-11-01 RX ORDER — SODIUM CHLORIDE, SODIUM LACTATE, POTASSIUM CHLORIDE, CALCIUM CHLORIDE 600; 310; 30; 20 MG/100ML; MG/100ML; MG/100ML; MG/100ML
125 INJECTION, SOLUTION INTRAVENOUS CONTINUOUS
Status: DISCONTINUED | OUTPATIENT
Start: 2022-11-01 | End: 2022-11-01

## 2022-11-01 RX ORDER — PROPOFOL 10 MG/ML
INJECTION, EMULSION INTRAVENOUS CONTINUOUS PRN
Status: DISCONTINUED | OUTPATIENT
Start: 2022-11-01 | End: 2022-11-01

## 2022-11-01 RX ORDER — PROPOFOL 10 MG/ML
INJECTION, EMULSION INTRAVENOUS AS NEEDED
Status: DISCONTINUED | OUTPATIENT
Start: 2022-11-01 | End: 2022-11-01

## 2022-11-01 RX ORDER — GLYCOPYRROLATE 0.2 MG/ML
INJECTION INTRAMUSCULAR; INTRAVENOUS AS NEEDED
Status: DISCONTINUED | OUTPATIENT
Start: 2022-11-01 | End: 2022-11-01

## 2022-11-01 RX ADMIN — LIDOCAINE HYDROCHLORIDE 100 MG: 20 INJECTION, SOLUTION EPIDURAL; INFILTRATION; INTRACAUDAL; PERINEURAL at 07:27

## 2022-11-01 RX ADMIN — SODIUM CHLORIDE, SODIUM LACTATE, POTASSIUM CHLORIDE, AND CALCIUM CHLORIDE: .6; .31; .03; .02 INJECTION, SOLUTION INTRAVENOUS at 06:41

## 2022-11-01 RX ADMIN — PROPOFOL 80 MG: 10 INJECTION, EMULSION INTRAVENOUS at 07:27

## 2022-11-01 RX ADMIN — PROPOFOL 120 MCG/KG/MIN: 10 INJECTION, EMULSION INTRAVENOUS at 07:27

## 2022-11-01 RX ADMIN — GLYCOPYRROLATE 0.2 MG: 0.2 INJECTION, SOLUTION INTRAMUSCULAR; INTRAVENOUS at 07:24

## 2022-11-01 NOTE — ANESTHESIA PREPROCEDURE EVALUATION
Procedure:  EGD    Relevant Problems   CARDIO   (+) Benign essential HTN   (+) Chest pain      GI/HEPATIC   (+) Cyst of pancreas   (+) Gastroesophageal reflux disease without esophagitis      MUSCULOSKELETAL   (+) Gout      NEURO/PSYCH   (+) History of colon polyps   (+) History of squamous cell carcinoma of skin   (+) History of testicular cancer      PULMONARY   (+) SOB (shortness of breath)        TTE 1/4/2022  LVEF 50%  Normal RV  Mildly dilated LA  Mild MR    NM perfusion spect 12/30/2021  No evidence for ischemia by perfusion imaging             Anesthesia Plan  ASA Score- 2     Anesthesia Type- IV sedation with anesthesia with ASA Monitors  Additional Monitors:   Airway Plan:     Comment: Back up GA  Plan Factors-    Induction-     Postoperative Plan-     Informed Consent-   I personally reviewed this patient with the CRNA  Discussed and agreed on the Anesthesia Plan with the CRNA  Jose Dumont

## 2022-11-01 NOTE — INTERVAL H&P NOTE
H&P reviewed  After examining the patient I find no changes in the patients condition since the H&P had been written      Vitals:    11/01/22 0648   BP: (!) 202/95   Pulse: 71   Resp: 18   Temp: 98 6 °F (37 °C)   SpO2: 98%

## 2022-11-01 NOTE — ANESTHESIA POSTPROCEDURE EVALUATION
Post-Op Assessment Note    CV Status:  Stable  Pain Score: 0    Pain management: adequate     Mental Status:  Arousable   Hydration Status:  Stable   PONV Controlled:  None   Airway Patency:  Patent      Post Op Vitals Reviewed: Yes      Staff: CRNA         No complications documented      BP  178/87   Temp      Pulse 87   Resp   22   SpO2   98

## 2022-11-14 ENCOUNTER — TELEPHONE (OUTPATIENT)
Dept: SURGERY | Facility: CLINIC | Age: 72
End: 2022-11-14

## 2022-11-16 ENCOUNTER — OFFICE VISIT (OUTPATIENT)
Dept: SURGERY | Facility: CLINIC | Age: 72
End: 2022-11-16

## 2022-11-16 VITALS — TEMPERATURE: 97.7 F

## 2022-11-16 DIAGNOSIS — K22.70 BARRETT'S ESOPHAGUS WITHOUT DYSPLASIA: Primary | ICD-10-CM

## 2022-11-16 NOTE — ASSESSMENT & PLAN NOTE
Returns following EGD without complaints, no problems post-op  Using omeprazole daily as prescribed without symptoms  Well appearing on exam  EGD reveals hernandez's esophagus, pathology reveals no dysplasia  I advised continued PPI use and routine surveillance with EGD in 1-2 years  Will keep yearly follow-up for umbilical hernia and to review symptoms

## 2022-11-16 NOTE — PROGRESS NOTES
Post-Op Note - General Surgery   Hanna Reagan 67 y o  male MRN: [id number]  Encounter: [id number]    Assessment/Plan    Hernandez's esophagus without dysplasia  Returns following EGD without complaints, no problems post-op  Using omeprazole daily as prescribed without symptoms  Well appearing on exam  EGD reveals hernandez's esophagus, pathology reveals no dysplasia  I advised continued PPI use and routine surveillance with EGD in 1-2 years  Will keep yearly follow-up for umbilical hernia and to review symptoms  Diagnoses and all orders for this visit:    Hernandez's esophagus without dysplasia        Subjective      Chief Complaint   Patient presents with   • Post-op     po EGD 11-     Patient came in today for a po EGD 11- to take biopsys  of his throat  Patient has no complaints of nausea/vomtiing, throat pain, abd pain, has no issues with his bowel movements or fevers/chills  YULI Minor 68 yo M here after EGD for Hernandez's esophagus  Reports feeling well  No symptoms, taking PPI, no problems post-op  Review of Systems    The following portions of the patient's history were reviewed and updated as appropriate: allergies, current medications, past family history, past medical history, past social history, past surgical history and problem list     Objective      Temperature 97 7 °F (36 5 °C), temperature source Temporal    Physical Exam  Vitals and nursing note reviewed  Constitutional:       General: He is not in acute distress  Appearance: He is well-developed and well-nourished  He is not diaphoretic  HENT:      Head: Normocephalic and atraumatic  Eyes:      Extraocular Movements: EOM normal       Conjunctiva/sclera: Conjunctivae normal       Pupils: Pupils are equal, round, and reactive to light  Pulmonary:      Effort: No respiratory distress  Musculoskeletal:         General: Normal range of motion  Cervical back: Normal range of motion     Skin:     General: Skin is warm and dry  Capillary Refill: Capillary refill takes less than 2 seconds  Neurological:      Mental Status: He is alert and oriented to person, place, and time     Psychiatric:         Mood and Affect: Mood and affect normal          Behavior: Behavior normal          Signature:  Owen Bean PA-C  Date: 11/16/2022 Time: 10:39 AM

## 2022-12-12 ENCOUNTER — APPOINTMENT (OUTPATIENT)
Dept: LAB | Facility: CLINIC | Age: 72
End: 2022-12-12

## 2022-12-12 DIAGNOSIS — Z85.47 HISTORY OF TESTICULAR CANCER: ICD-10-CM

## 2022-12-12 LAB
AFP-TM SERPL-MCNC: 5.4 NG/ML (ref 0.5–8)
ALBUMIN SERPL BCP-MCNC: 3.4 G/DL (ref 3.5–5)
ALP SERPL-CCNC: 41 U/L (ref 46–116)
ALT SERPL W P-5'-P-CCNC: 23 U/L (ref 12–78)
ANION GAP SERPL CALCULATED.3IONS-SCNC: 7 MMOL/L (ref 4–13)
AST SERPL W P-5'-P-CCNC: 19 U/L (ref 5–45)
B-HCG SERPL-ACNC: <2 MIU/ML
BASOPHILS # BLD AUTO: 0.07 THOUSANDS/ÂΜL (ref 0–0.1)
BASOPHILS NFR BLD AUTO: 1 % (ref 0–1)
BILIRUB SERPL-MCNC: 0.64 MG/DL (ref 0.2–1)
BUN SERPL-MCNC: 13 MG/DL (ref 5–25)
CALCIUM ALBUM COR SERPL-MCNC: 9.8 MG/DL (ref 8.3–10.1)
CALCIUM SERPL-MCNC: 9.3 MG/DL (ref 8.3–10.1)
CHLORIDE SERPL-SCNC: 98 MMOL/L (ref 96–108)
CO2 SERPL-SCNC: 26 MMOL/L (ref 21–32)
CREAT SERPL-MCNC: 1 MG/DL (ref 0.6–1.3)
EOSINOPHIL # BLD AUTO: 0.16 THOUSAND/ÂΜL (ref 0–0.61)
EOSINOPHIL NFR BLD AUTO: 3 % (ref 0–6)
ERYTHROCYTE [DISTWIDTH] IN BLOOD BY AUTOMATED COUNT: 12.2 % (ref 11.6–15.1)
GFR SERPL CREATININE-BSD FRML MDRD: 74 ML/MIN/1.73SQ M
GLUCOSE P FAST SERPL-MCNC: 102 MG/DL (ref 65–99)
HCT VFR BLD AUTO: 36.4 % (ref 36.5–49.3)
HGB BLD-MCNC: 12.4 G/DL (ref 12–17)
IMM GRANULOCYTES # BLD AUTO: 0.01 THOUSAND/UL (ref 0–0.2)
IMM GRANULOCYTES NFR BLD AUTO: 0 % (ref 0–2)
LDH SERPL-CCNC: 231 U/L (ref 81–234)
LYMPHOCYTES # BLD AUTO: 1.27 THOUSANDS/ÂΜL (ref 0.6–4.47)
LYMPHOCYTES NFR BLD AUTO: 25 % (ref 14–44)
MCH RBC QN AUTO: 33.2 PG (ref 26.8–34.3)
MCHC RBC AUTO-ENTMCNC: 34.1 G/DL (ref 31.4–37.4)
MCV RBC AUTO: 98 FL (ref 82–98)
MONOCYTES # BLD AUTO: 0.87 THOUSAND/ÂΜL (ref 0.17–1.22)
MONOCYTES NFR BLD AUTO: 17 % (ref 4–12)
NEUTROPHILS # BLD AUTO: 2.81 THOUSANDS/ÂΜL (ref 1.85–7.62)
NEUTS SEG NFR BLD AUTO: 54 % (ref 43–75)
NRBC BLD AUTO-RTO: 0 /100 WBCS
PLATELET # BLD AUTO: 370 THOUSANDS/UL (ref 149–390)
PMV BLD AUTO: 9.1 FL (ref 8.9–12.7)
POTASSIUM SERPL-SCNC: 4.2 MMOL/L (ref 3.5–5.3)
PROT SERPL-MCNC: 6.8 G/DL (ref 6.4–8.4)
RBC # BLD AUTO: 3.73 MILLION/UL (ref 3.88–5.62)
SODIUM SERPL-SCNC: 131 MMOL/L (ref 135–147)
WBC # BLD AUTO: 5.19 THOUSAND/UL (ref 4.31–10.16)

## 2022-12-14 ENCOUNTER — HOSPITAL ENCOUNTER (OUTPATIENT)
Dept: CT IMAGING | Facility: HOSPITAL | Age: 72
Discharge: HOME | End: 2022-12-14

## 2022-12-14 DIAGNOSIS — Z85.47 PERSONAL HISTORY OF MALIGNANT NEOPLASM OF TESTIS: ICD-10-CM

## 2022-12-14 RX ADMIN — IOHEXOL 85 ML: 350 INJECTION, SOLUTION INTRAVENOUS at 06:52

## 2023-01-04 ENCOUNTER — TELEPHONE (OUTPATIENT)
Dept: SURGERY | Facility: CLINIC | Age: 73
End: 2023-01-04

## 2023-01-04 DIAGNOSIS — K22.70 BARRETT'S ESOPHAGUS WITHOUT DYSPLASIA: ICD-10-CM

## 2023-01-04 DIAGNOSIS — K21.9 GASTROESOPHAGEAL REFLUX DISEASE WITHOUT ESOPHAGITIS: ICD-10-CM

## 2023-01-04 RX ORDER — OMEPRAZOLE 20 MG/1
20 CAPSULE, DELAYED RELEASE ORAL EVERY MORNING
Qty: 90 CAPSULE | Refills: 4 | Status: SHIPPED | OUTPATIENT
Start: 2023-01-04 | End: 2024-03-29

## 2023-06-19 ENCOUNTER — APPOINTMENT (EMERGENCY)
Dept: CT IMAGING | Facility: HOSPITAL | Age: 73
End: 2023-06-19
Payer: MEDICARE

## 2023-06-19 ENCOUNTER — HOSPITAL ENCOUNTER (EMERGENCY)
Facility: HOSPITAL | Age: 73
Discharge: HOME | End: 2023-06-19
Attending: EMERGENCY MEDICINE | Admitting: EMERGENCY MEDICINE
Payer: MEDICARE

## 2023-06-19 VITALS
TEMPERATURE: 97.2 F | BODY MASS INDEX: 32.96 KG/M2 | DIASTOLIC BLOOD PRESSURE: 105 MMHG | HEIGHT: 67 IN | OXYGEN SATURATION: 96 % | SYSTOLIC BLOOD PRESSURE: 185 MMHG | RESPIRATION RATE: 18 BRPM | WEIGHT: 210 LBS | HEART RATE: 71 BPM

## 2023-06-19 DIAGNOSIS — M54.9 MUSCULOSKELETAL BACK PAIN: Primary | ICD-10-CM

## 2023-06-19 DIAGNOSIS — E87.1 HYPONATREMIA: ICD-10-CM

## 2023-06-19 DIAGNOSIS — I10 HYPERTENSION: ICD-10-CM

## 2023-06-19 LAB
ALBUMIN SERPL BCP-MCNC: 4.1 G/DL (ref 3.5–5)
ALP SERPL-CCNC: 41 U/L (ref 34–104)
ALT SERPL W P-5'-P-CCNC: 15 U/L (ref 7–52)
ANION GAP SERPL CALCULATED.3IONS-SCNC: 9 MMOL/L (ref 4–13)
APTT PPP: 26 SECONDS (ref 23–37)
AST SERPL W P-5'-P-CCNC: 19 U/L (ref 13–39)
BASOPHILS # BLD AUTO: 0.06 THOUSANDS/ÂΜL (ref 0–0.1)
BASOPHILS NFR BLD AUTO: 1 % (ref 0–1)
BILIRUB SERPL-MCNC: 0.54 MG/DL (ref 0.2–1)
BUN SERPL-MCNC: 10 MG/DL (ref 5–25)
CALCIUM SERPL-MCNC: 9.1 MG/DL (ref 8.4–10.2)
CHLORIDE SERPL-SCNC: 92 MMOL/L (ref 96–108)
CO2 SERPL-SCNC: 25 MMOL/L (ref 21–32)
CREAT SERPL-MCNC: 0.87 MG/DL (ref 0.6–1.3)
EOSINOPHIL # BLD AUTO: 0.12 THOUSAND/ÂΜL (ref 0–0.61)
EOSINOPHIL NFR BLD AUTO: 2 % (ref 0–6)
ERYTHROCYTE [DISTWIDTH] IN BLOOD BY AUTOMATED COUNT: 11.7 % (ref 11.6–15.1)
GFR SERPL CREATININE-BSD FRML MDRD: 85 ML/MIN/1.73SQ M
GLUCOSE SERPL-MCNC: 94 MG/DL (ref 65–140)
HCT VFR BLD AUTO: 36.1 % (ref 36.5–49.3)
HGB BLD-MCNC: 12.7 G/DL (ref 12–17)
IMM GRANULOCYTES # BLD AUTO: 0.02 THOUSAND/UL (ref 0–0.2)
IMM GRANULOCYTES NFR BLD AUTO: 0 % (ref 0–2)
INR PPP: 0.98 (ref 0.84–1.19)
LYMPHOCYTES # BLD AUTO: 1.23 THOUSANDS/ÂΜL (ref 0.6–4.47)
LYMPHOCYTES NFR BLD AUTO: 19 % (ref 14–44)
MCH RBC QN AUTO: 34.2 PG (ref 26.8–34.3)
MCHC RBC AUTO-ENTMCNC: 35.2 G/DL (ref 31.4–37.4)
MCV RBC AUTO: 97 FL (ref 82–98)
MONOCYTES # BLD AUTO: 0.81 THOUSAND/ÂΜL (ref 0.17–1.22)
MONOCYTES NFR BLD AUTO: 12 % (ref 4–12)
NEUTROPHILS # BLD AUTO: 4.37 THOUSANDS/ÂΜL (ref 1.85–7.62)
NEUTS SEG NFR BLD AUTO: 66 % (ref 43–75)
NRBC BLD AUTO-RTO: 0 /100 WBCS
PLATELET # BLD AUTO: 285 THOUSANDS/UL (ref 149–390)
PMV BLD AUTO: 8.5 FL (ref 8.9–12.7)
POTASSIUM SERPL-SCNC: 3.8 MMOL/L (ref 3.5–5.3)
PROT SERPL-MCNC: 7 G/DL (ref 6.4–8.4)
PROTHROMBIN TIME: 13 SECONDS (ref 11.6–14.5)
RBC # BLD AUTO: 3.71 MILLION/UL (ref 3.88–5.62)
SODIUM SERPL-SCNC: 126 MMOL/L (ref 135–147)
WBC # BLD AUTO: 6.61 THOUSAND/UL (ref 4.31–10.16)

## 2023-06-19 PROCEDURE — 85610 PROTHROMBIN TIME: CPT | Performed by: EMERGENCY MEDICINE

## 2023-06-19 PROCEDURE — 80053 COMPREHEN METABOLIC PANEL: CPT | Performed by: EMERGENCY MEDICINE

## 2023-06-19 PROCEDURE — 99284 EMERGENCY DEPT VISIT MOD MDM: CPT

## 2023-06-19 PROCEDURE — 85025 COMPLETE CBC W/AUTO DIFF WBC: CPT | Performed by: EMERGENCY MEDICINE

## 2023-06-19 PROCEDURE — 71275 CT ANGIOGRAPHY CHEST: CPT

## 2023-06-19 PROCEDURE — 85730 THROMBOPLASTIN TIME PARTIAL: CPT | Performed by: EMERGENCY MEDICINE

## 2023-06-19 PROCEDURE — 96361 HYDRATE IV INFUSION ADD-ON: CPT

## 2023-06-19 PROCEDURE — G1004 CDSM NDSC: HCPCS

## 2023-06-19 PROCEDURE — 36415 COLL VENOUS BLD VENIPUNCTURE: CPT | Performed by: EMERGENCY MEDICINE

## 2023-06-19 PROCEDURE — 96375 TX/PRO/DX INJ NEW DRUG ADDON: CPT

## 2023-06-19 PROCEDURE — 96374 THER/PROPH/DIAG INJ IV PUSH: CPT

## 2023-06-19 PROCEDURE — 96372 THER/PROPH/DIAG INJ SC/IM: CPT

## 2023-06-19 PROCEDURE — 74174 CTA ABD&PLVS W/CONTRAST: CPT

## 2023-06-19 RX ORDER — KETOROLAC TROMETHAMINE 30 MG/ML
15 INJECTION, SOLUTION INTRAMUSCULAR; INTRAVENOUS ONCE
Status: COMPLETED | OUTPATIENT
Start: 2023-06-19 | End: 2023-06-19

## 2023-06-19 RX ORDER — HYDROMORPHONE HCL/PF 1 MG/ML
0.5 SYRINGE (ML) INJECTION ONCE
Status: COMPLETED | OUTPATIENT
Start: 2023-06-19 | End: 2023-06-19

## 2023-06-19 RX ORDER — DIAZEPAM 5 MG/ML
2.5 INJECTION, SOLUTION INTRAMUSCULAR; INTRAVENOUS ONCE
Status: COMPLETED | OUTPATIENT
Start: 2023-06-19 | End: 2023-06-19

## 2023-06-19 RX ORDER — LABETALOL HYDROCHLORIDE 5 MG/ML
10 INJECTION, SOLUTION INTRAVENOUS ONCE
Status: COMPLETED | OUTPATIENT
Start: 2023-06-19 | End: 2023-06-19

## 2023-06-19 RX ORDER — ACETAMINOPHEN 325 MG/1
975 TABLET ORAL ONCE
Status: COMPLETED | OUTPATIENT
Start: 2023-06-19 | End: 2023-06-19

## 2023-06-19 RX ORDER — LIDOCAINE 50 MG/G
1 PATCH TOPICAL ONCE
Status: DISCONTINUED | OUTPATIENT
Start: 2023-06-19 | End: 2023-06-19 | Stop reason: HOSPADM

## 2023-06-19 RX ORDER — AMLODIPINE BESYLATE 5 MG/1
5 TABLET ORAL DAILY
Qty: 14 TABLET | Refills: 0 | Status: SHIPPED | OUTPATIENT
Start: 2023-06-19 | End: 2023-07-03

## 2023-06-19 RX ADMIN — HYDROMORPHONE HYDROCHLORIDE 0.5 MG: 1 INJECTION, SOLUTION INTRAMUSCULAR; INTRAVENOUS; SUBCUTANEOUS at 17:52

## 2023-06-19 RX ADMIN — IOHEXOL 100 ML: 350 INJECTION, SOLUTION INTRAVENOUS at 16:47

## 2023-06-19 RX ADMIN — KETOROLAC TROMETHAMINE 15 MG: 30 INJECTION, SOLUTION INTRAMUSCULAR at 15:47

## 2023-06-19 RX ADMIN — SODIUM CHLORIDE 1000 ML: 0.9 INJECTION, SOLUTION INTRAVENOUS at 16:57

## 2023-06-19 RX ADMIN — DIAZEPAM 2.5 MG: 5 INJECTION INTRAMUSCULAR; INTRAVENOUS at 15:46

## 2023-06-19 RX ADMIN — ACETAMINOPHEN 975 MG: 325 TABLET ORAL at 15:47

## 2023-06-19 RX ADMIN — LIDOCAINE 1 PATCH: 50 PATCH TOPICAL at 15:48

## 2023-06-19 RX ADMIN — LABETALOL HYDROCHLORIDE 10 MG: 5 INJECTION, SOLUTION INTRAVENOUS at 18:13

## 2023-06-19 NOTE — ED PROVIDER NOTES
"History  Chief Complaint   Patient presents with   • Shoulder Pain     [address] w/complaint of shoulder pain which started Saturday; states was doing work at home Saturday when the pain started; denies fall, accident and/or injury; nothing OTC for pain; states inability to raise arm above head; denies history of hypertension     Patient is a 71-year-old male who presents for evaluation of left posterior shoulder pain  Patient says the symptoms have been intermittent since Saturday  He says that he was doing some weed whacking and yard work on Saturday  He says the symptoms started later that night  He says the pain is worse today  Says it started after \"raising his arm up this morning  \"  He says the pain is worse with movement of the arm especially over his head  The patient denies any chest pain, shortness breath, lightheadedness, nausea, vomiting  He says the pain radiates into the left arm  He denies any direct trauma to the shoulder  On exam, patient has increased muscle tension and point tenderness to the medial scapular area  Prior to Admission Medications   Prescriptions Last Dose Informant Patient Reported? Taking?    LOPEZ PO  Self Yes No   Sig: Take 1 capsule by mouth every morning   Suprep Bowel Prep Kit 17 5-3 13-1 6 GM/177ML SOLN   No No   Sig: Take 1 kit by mouth once for 1 dose   Patient not taking: Reported on 1/25/2022    omeprazole (PriLOSEC) 20 mg delayed release capsule   No No   Sig: Take 1 capsule (20 mg total) by mouth every morning   sodium chloride 1 g tablet  Self No No   Sig: Take 1 tablet (1 g total) by mouth 3 (three) times a day   Patient not taking: Reported on 12/13/2021   valACYclovir (VALTREX) 1,000 mg tablet   No No   Sig: Take 1 tablet (1,000 mg total) by mouth 3 (three) times a day for 7 days   Patient not taking: Reported on 1/25/2022       Facility-Administered Medications: None       Past Medical History:   Diagnosis Date   • Arthritis     generalized   • " Roque's esophagus without dysplasia 6/19/2019   • Cancer [facility])     testicular cancer and lung 2012  (had chemo 2012 and clear since)   • Chronic pain disorder     herniated disc in lumbar area; monthly visit to chiropractor   • Diverticulosis 6/19/2019   • Gastroesophageal reflux disease without esophagitis 2/25/2019   • Gout    • History of colon polyps 6/19/2019   • Kidney stone 2003? no problem since then   • SOB (shortness of breath)    • Syncope        Past Surgical History:   Procedure Laterality Date   • COLONOSCOPY  2016   • CYSTOSCOPY W/ LASER LITHOTRIPSY  2003? • EGD  2015    gabriele   • HERNIA REPAIR Left     x3 surgery   • LUNG REMOVAL, PARTIAL Right 2012    right middle   • SKIN BIOPSY     • TESTICLE SURGERY Right 2010    removal       Family History   Problem Relation Age of Onset   • Cancer Mother    • Cancer Father    • Thyroid cancer Brother    • Tongue cancer Brother      I have reviewed and agree with the history as documented  E-Cigarette/Vaping   • E-Cigarette Use Never User      E-Cigarette/Vaping Substances   • Nicotine No    • THC No    • CBD No    • Flavoring No    • Other No    • Unknown No      Social History     Tobacco Use   • Smoking status: Never   • Smokeless tobacco: Former     Types: Chew   Vaping Use   • Vaping Use: Never used   Substance Use Topics   • Alcohol use: Yes     Alcohol/week: 35 0 standard drinks of alcohol     Types: 35 Cans of beer per week   • Drug use: Never       Review of Systems   Constitutional: Negative for chills, fever and unexpected weight change  HENT: Negative for congestion, sore throat and trouble swallowing  Eyes: Negative for pain, discharge and itching  Respiratory: Negative for cough, chest tightness, shortness of breath and wheezing  Cardiovascular: Negative for chest pain, palpitations and leg swelling  Gastrointestinal: Negative for abdominal pain, blood in stool, diarrhea, nausea and vomiting     Endocrine: Negative for polyuria  Genitourinary: Negative for difficulty urinating, dysuria, frequency and hematuria  Musculoskeletal: Positive for back pain (left upper back)  Negative for arthralgias  Skin: Negative for color change and rash  Neurological: Negative for dizziness, syncope, weakness, light-headedness and headaches  Physical Exam  Physical Exam  Vitals and nursing note reviewed  Constitutional:       General: He is not in acute distress  Appearance: He is well-developed  HENT:      Head: Normocephalic and atraumatic  Right Ear: External ear normal       Left Ear: External ear normal    Eyes:      Conjunctiva/sclera: Conjunctivae normal       Pupils: Pupils are equal, round, and reactive to light  Cardiovascular:      Rate and Rhythm: Normal rate and regular rhythm  Heart sounds: Normal heart sounds  No murmur heard  No friction rub  No gallop  Pulmonary:      Effort: Pulmonary effort is normal  No respiratory distress  Breath sounds: Normal breath sounds  No wheezing or rales  Abdominal:      General: Bowel sounds are normal  There is no distension  Palpations: Abdomen is soft  Tenderness: There is no abdominal tenderness  There is no guarding  Musculoskeletal:         General: No tenderness or deformity  Normal range of motion  Cervical back: Normal range of motion  Back:    Lymphadenopathy:      Cervical: No cervical adenopathy  Skin:     General: Skin is warm and dry  Neurological:      General: No focal deficit present  Mental Status: He is alert and oriented to person, place, and time  Mental status is at baseline  Cranial Nerves: No cranial nerve deficit  Sensory: No sensory deficit  Motor: No weakness or abnormal muscle tone     Psychiatric:         Behavior: Behavior normal          Vital Signs  ED Triage Vitals   Temperature Pulse Respirations Blood Pressure SpO2   06/19/23 1514 06/19/23 1514 06/19/23 1514 06/19/23 1516 06/19/23 1514   (!) 97 2 °F (36 2 °C) 74 18 (!) 212/118 97 %      Temp Source Heart Rate Source Patient Position - Orthostatic VS BP Location FiO2 (%)   06/19/23 1514 06/19/23 1514 06/19/23 1514 06/19/23 1514 --   Tympanic Monitor Sitting Right arm       Pain Score       06/19/23 1514       10 - Worst Possible Pain           Vitals:    06/19/23 1514 06/19/23 1516 06/19/23 1815 06/19/23 1908   BP:  (!) 212/118 (!) 211/117 (!) 185/105   Pulse: 74  71    Patient Position - Orthostatic VS: Sitting  Lying          Visual Acuity      ED Medications  Medications   lidocaine (LIDODERM) 5 % patch 1 patch (1 patch Topical Medication Applied 6/19/23 1548)   ketorolac (TORADOL) injection 15 mg (15 mg Intramuscular Given 6/19/23 1547)   acetaminophen (TYLENOL) tablet 975 mg (975 mg Oral Given 6/19/23 1547)   diazepam (VALIUM) injection 2 5 mg (2 5 mg Intramuscular Given 6/19/23 1546)   sodium chloride 0 9 % bolus 1,000 mL (0 mL Intravenous Stopped 6/19/23 1937)   iohexol (OMNIPAQUE) 350 MG/ML injection (SINGLE-DOSE) 100 mL (100 mL Intravenous Given 6/19/23 1647)   HYDROmorphone (DILAUDID) injection 0 5 mg (0 5 mg Intravenous Given 6/19/23 1752)   labetalol (NORMODYNE) injection 10 mg (10 mg Intravenous Given 6/19/23 1813)       Diagnostic Studies  Results Reviewed     Procedure Component Value Units Date/Time    Basic metabolic panel [[id number]]     Lab Status: No result Specimen: Blood     Comprehensive metabolic panel [[id number]]  (Abnormal) Collected: 06/19/23 1640    Lab Status: Final result Specimen: Blood from Arm, Right Updated: 06/19/23 1703     Sodium 126 mmol/L      Potassium 3 8 mmol/L      Chloride 92 mmol/L      CO2 25 mmol/L      ANION GAP 9 mmol/L      BUN 10 mg/dL      Creatinine 0 87 mg/dL      Glucose 94 mg/dL      Calcium 9 1 mg/dL      AST 19 U/L      ALT 15 U/L      Alkaline Phosphatase 41 U/L      Total Protein 7 0 g/dL      Albumin 4 1 g/dL      Total Bilirubin 0 54 mg/dL      eGFR 85 ml/min/1 73sq m Narrative:      National Kidney Disease Foundation guidelines for Chronic Kidney Disease (CKD):   •  Stage 1 with normal or high GFR (GFR > 90 mL/min/1 73 square meters)  •  Stage 2 Mild CKD (GFR = 60-89 mL/min/1 73 square meters)  •  Stage 3A Moderate CKD (GFR = 45-59 mL/min/1 73 square meters)  •  Stage 3B Moderate CKD (GFR = 30-44 mL/min/1 73 square meters)  •  Stage 4 Severe CKD (GFR = 15-29 mL/min/1 73 square meters)  •  Stage 5 End Stage CKD (GFR <15 mL/min/1 73 square meters)  Note: GFR calculation is accurate only with a steady state creatinine    Protime-INR [[id number]]  (Normal) Collected: 06/19/23 1640    Lab Status: Final result Specimen: Blood from Arm, Right Updated: 06/19/23 1659     Protime 13 0 seconds      INR 0 98    APTT [[id number]]  (Normal) Collected: 06/19/23 1640    Lab Status: Final result Specimen: Blood from Arm, Right Updated: 06/19/23 1659     PTT 26 seconds     CBC and differential [[id number]]  (Abnormal) Collected: 06/19/23 1640    Lab Status: Final result Specimen: Blood from Arm, Right Updated: 06/19/23 1648     WBC 6 61 Thousand/uL      RBC 3 71 Million/uL      Hemoglobin 12 7 g/dL      Hematocrit 36 1 %      MCV 97 fL      MCH 34 2 pg      MCHC 35 2 g/dL      RDW 11 7 %      MPV 8 5 fL      Platelets 975 Thousands/uL      nRBC 0 /100 WBCs      Neutrophils Relative 66 %      Immat GRANS % 0 %      Lymphocytes Relative 19 %      Monocytes Relative 12 %      Eosinophils Relative 2 %      Basophils Relative 1 %      Neutrophils Absolute 4 37 Thousands/µL      Immature Grans Absolute 0 02 Thousand/uL      Lymphocytes Absolute 1 23 Thousands/µL      Monocytes Absolute 0 81 Thousand/µL      Eosinophils Absolute 0 12 Thousand/µL      Basophils Absolute 0 06 Thousands/µL                  CTA dissection protocol chest/abdomen/pelvis   Final Result by Tia Dutton DO (06/19 1745)      1  No evidence of aortic aneurysm or dissection  2  No acute intra-abdominal abnormality  3  Small umbilical hernia containing fat and nonobstructed small bowel  4  Stable 5 mm left upper lobe pulmonary nodule  5  Tiny nonobstructing bilateral calculi  Additional incidental findings as above  Workstation performed: [id number]                    Procedures  Procedures         ED Course  ED Course as of 06/19/23 2004 Mon Jun 19, 2023   1632 Patient still having back pain despite the medications  BP remains persistently elevated at 212/119  Given this, will obtain blood work, will obtain CTA dissection chest abdomen pelvis  Patient denies any neck pain, headache, lightheadedness, dizziness   1909 Spoke with patient about blood work  Said that I would like to repeat a CMP to the fluids given his sodium of 126  Patient says that his been low before  He would prefer to follow-up with his family doctor as an outpatient  I also talked him about his blood pressure  Said I will be starting him on a low-dose medication and that he needs to follow-up with his family doctor as well for this  SBIRT 20yo+    Flowsheet Row Most Recent Value   Initial Alcohol Screen: US AUDIT-C     1  How often do you have a drink containing alcohol? 0 Filed at: 06/19/2023 1516   2  How many drinks containing alcohol do you have on a typical day you are drinking? 0 Filed at: 06/19/2023 1516   3b  FEMALE Any Age, or MALE 65+: How often do you have 4 or more drinks on one occassion? 0 Filed at: 06/19/2023 1516   Audit-C Score 0 Filed at: 06/19/2023 1516   ANUSHA: How many times in the past year have you    Used an illegal drug or used a prescription medication for non-medical reasons? Never Filed at: 06/19/2023 1516                    Medical Decision Making  71-year-old male presenting for evaluation of left posterior shoulder pain  Since Saturday after doing yard work  Worse today after raising his arm up  Worse with movement of the arm    Has point tenderness to the medial scapular border  Vitals within normal except for hypertension  No history of hypertension but has had elevated readings on previous notes  Believe symptoms are likely musculoskeletal given the point muscular tenderness and pain with movement  Patient was treated with Lidoderm patch, Toradol, Tylenol with little improvement in symptoms  BP remained elevated  Given persistent back pain and elevated blood pressure, CTA dissection was ordered to rule out aortic dissection  Labs show sodium of 126, otherwise labs within normal limits  CT showed no acute aneurysm or dissection  Showed some incidental findings  Patient given dose of labetalol with improvement of blood pressure  Denies headache, lightheadedness, dizziness, chest pain  Patient did not wish to stay for repeat BMP after fluids  Would rather follow-up with his family doctor for sodium level as well as blood pressure  Patient started on amlodipine 5 mg daily  Told to return to the ED if symptoms acutely worsen    Hypertension: acute illness or injury  Hyponatremia: acute illness or injury  Musculoskeletal back pain: acute illness or injury  Amount and/or Complexity of Data Reviewed  Labs: ordered  Radiology: ordered  Risk  OTC drugs  Prescription drug management  Disposition  Final diagnoses:   Musculoskeletal back pain   Hypertension   Hyponatremia     Time reflects when diagnosis was documented in both MDM as applicable and the Disposition within this note     Time User Action Codes Description Comment    6/19/2023  7:14 PM Josie Oakley [M54 9] Musculoskeletal back pain     6/19/2023  7:14 PM Darrel Linder [I10] Hypertension     6/19/2023  7:14 PM Josie Oakley [E87 1] Hyponatremia       ED Disposition     ED Disposition   Discharge    Condition   Stable    Date/Time   Mon Jun 19, 2023  7:14 PM    Comment   Marika Ca discharge to home/self care                 Follow-up Information     Follow up With Specialties Details Why Contact Info Additional Information    Jak Newton DO General Practice Schedule an appointment as soon as possible for a visit  For follow up of hypertension and hyponatremia [address]  [address]  [address]       [address] [facility] Schedule an appointment as soon as possible for a visit  For follow up of hyponatremia and high blood pressure [address], [address], [address], [place] [address]          Discharge Medication List as of 6/19/2023  7:16 PM      START taking these medications    Details   amLODIPine (NORVASC) 5 mg tablet Take 1 tablet (5 mg total) by mouth daily for 14 days, Starting Mon 6/19/2023, Until Mon 7/3/2023, Normal         CONTINUE these medications which have NOT CHANGED    Details   CHERRY PO Take 1 capsule by mouth every morning, Historical Med      omeprazole (PriLOSEC) 20 mg delayed release capsule Take 1 capsule (20 mg total) by mouth every morning, Starting Wed 1/4/2023, Until Fri 3/29/2024, Normal      sodium chloride 1 g tablet Take 1 tablet (1 g total) by mouth 3 (three) times a day, Starting Wed 10/20/2021, Normal      Suprep Bowel Prep Kit 17 5-3 13-1 6 GM/177ML SOLN Take 1 kit by mouth once for 1 dose, Starting Fri 8/21/2020, Normal      valACYclovir (VALTREX) 1,000 mg tablet Take 1 tablet (1,000 mg total) by mouth 3 (three) times a day for 7 days, Starting Mon 5/3/2021, Until Mon 5/10/2021, Normal             No discharge procedures on file      PDMP Review     None          ED Provider  Electronically Signed by           Arlet Gomez DO  06/19/23 2005

## 2023-06-19 NOTE — ED TRIAGE NOTES
[address] w/complaint of shoulder pain which started Saturday; states was doing work at home Saturday when the pain started; denies fall, accident and/or injury; nothing OTC for pain; states inability to raise arm above head; denies history of hypertension

## 2023-06-21 ENCOUNTER — OFFICE VISIT (OUTPATIENT)
Dept: URGENT CARE | Facility: CLINIC | Age: 73
End: 2023-06-21
Payer: MEDICARE

## 2023-06-21 ENCOUNTER — APPOINTMENT (OUTPATIENT)
Dept: RADIOLOGY | Facility: CLINIC | Age: 73
End: 2023-06-21
Payer: MEDICARE

## 2023-06-21 VITALS
WEIGHT: 209 LBS | HEART RATE: 74 BPM | RESPIRATION RATE: 16 BRPM | SYSTOLIC BLOOD PRESSURE: 160 MMHG | BODY MASS INDEX: 32.73 KG/M2 | DIASTOLIC BLOOD PRESSURE: 82 MMHG | OXYGEN SATURATION: 99 % | TEMPERATURE: 98 F

## 2023-06-21 DIAGNOSIS — M25.512 ACUTE PAIN OF LEFT SHOULDER: ICD-10-CM

## 2023-06-21 DIAGNOSIS — M25.512 ACUTE PAIN OF LEFT SHOULDER: Primary | ICD-10-CM

## 2023-06-21 PROCEDURE — 73030 X-RAY EXAM OF SHOULDER: CPT

## 2023-06-21 PROCEDURE — G0463 HOSPITAL OUTPT CLINIC VISIT: HCPCS

## 2023-06-21 PROCEDURE — 99213 OFFICE O/P EST LOW 20 MIN: CPT

## 2023-06-21 RX ORDER — METHOCARBAMOL 500 MG/1
500 TABLET, FILM COATED ORAL
Qty: 7 TABLET | Refills: 0 | Status: SHIPPED | OUTPATIENT
Start: 2023-06-21

## 2023-06-21 NOTE — PROGRESS NOTES
[facility]        NAME: Beulah Almeida is a 75 y o  male  : 1950    MRN: [id number]  DATE: 2023  TIME: 8:48 AM    Assessment and Plan   Acute pain of left shoulder [M25 512]  1  Acute pain of left shoulder  XR shoulder 2+ vw left        No evidence of fracture seen on x-ray per provider read, will follow-up with final read by radiology if findings are signficant  Sling provided for comfort and referral placed to physical therapy and orthopedics for further management of symptoms  Patient Instructions     Tylenol and lidocaine patches as needed throughout the day, robaxin at night  Wear sling during the day for the next week  Follow-up with physical therapy and orthopedics for further management  Proceed to  ER if symptoms worsen  Chief Complaint     Chief Complaint   Patient presents with   • Shoulder Pain     Left shoulder pain since Monday  Radiates down arm  Occ tingling in hand  Pain with movement, disrupts sleep  Not currently taking anything for pain except tylenol  PT was seen in ER for shoulder pain         History of Present Illness       75year old male presents for evaluation of left shoulder pain that started  after he was weed whacking  He was seen in the ER where they did a CTA to rule out dissection but no x-ray of his shoulder was done at that time  He relates the pain is constant, rated 9/10, with numbness/tingling in his fingers of his left hand and worsened when he puts his arm behind his back  He was also given toradol, tylenol, and a lidoderm patch in the ER and taking tylenol since his visit with minimal improvement of symptoms  He is requesting an x-ray of his left shoulder  Shoulder Pain   The pain is present in the left shoulder  This is a new problem  The current episode started in the past 7 days  There has been no history of extremity trauma  The problem occurs constantly  The problem has been unchanged   The quality of the pain is described as aching  The pain is at a severity of 9/10  The pain is moderate  Associated symptoms include numbness and tingling  Pertinent negatives include no fever, inability to bear weight, itching, joint locking, joint swelling, limited range of motion or stiffness  The symptoms are aggravated by activity  He has tried acetaminophen for the symptoms  The treatment provided no relief  Review of Systems   Review of Systems   Constitutional: Negative for activity change, appetite change, chills, fatigue and fever  Respiratory: Negative for chest tightness and shortness of breath  Cardiovascular: Negative for chest pain and palpitations  Gastrointestinal: Negative for abdominal pain, constipation, diarrhea, nausea and vomiting  Musculoskeletal: Negative for arthralgias, back pain, neck pain and stiffness  Skin: Negative for color change and itching  Neurological: Positive for tingling, weakness (left shoulder) and numbness  Negative for dizziness and light-headedness           Current Medications       Current Outpatient Medications:   •  amLODIPine (NORVASC) 5 mg tablet, Take 1 tablet (5 mg total) by mouth daily for 14 days, Disp: 14 tablet, Rfl: 0  •  LOPEZ PO, Take 1 capsule by mouth every morning, Disp: , Rfl:   •  omeprazole (PriLOSEC) 20 mg delayed release capsule, Take 1 capsule (20 mg total) by mouth every morning, Disp: 90 capsule, Rfl: 4  •  sodium chloride 1 g tablet, Take 1 tablet (1 g total) by mouth 3 (three) times a day (Patient not taking: Reported on 12/13/2021), Disp: 9 tablet, Rfl: 0  •  Suprep Bowel Prep Kit 17 5-3 13-1 6 GM/177ML SOLN, Take 1 kit by mouth once for 1 dose (Patient not taking: Reported on 1/25/2022 ), Disp: 1 Bottle, Rfl: 0  •  valACYclovir (VALTREX) 1,000 mg tablet, Take 1 tablet (1,000 mg total) by mouth 3 (three) times a day for 7 days (Patient not taking: Reported on 1/25/2022 ), Disp: 21 tablet, Rfl: 0    Current Allergies     Allergies as of 06/21/2023   • (No Known Allergies)            The following portions of the patient's history were reviewed and updated as appropriate: allergies, current medications, past family history, past medical history, past social history, past surgical history and problem list      Past Medical History:   Diagnosis Date   • Arthritis     generalized   • Roque's esophagus without dysplasia 6/19/2019   • Cancer [facility])     testicular cancer and lung 2012  (had chemo 2012 and clear since)   • Chronic pain disorder     herniated disc in lumbar area; monthly visit to chiropractor   • Diverticulosis 6/19/2019   • Gastroesophageal reflux disease without esophagitis 2/25/2019   • Gout    • History of colon polyps 6/19/2019   • Kidney stone 2003? no problem since then   • SOB (shortness of breath)    • Syncope        Past Surgical History:   Procedure Laterality Date   • COLONOSCOPY  2016   • CYSTOSCOPY W/ LASER LITHOTRIPSY  2003? • EGD  2015    gabriele   • HERNIA REPAIR Left     x3 surgery   • LUNG REMOVAL, PARTIAL Right 2012    right middle   • SKIN BIOPSY     • TESTICLE SURGERY Right 2010    removal       Family History   Problem Relation Age of Onset   • Cancer Mother    • Cancer Father    • Thyroid cancer Brother    • Tongue cancer Brother          Medications have been verified  Objective   BP (!) 180/90   Pulse 74   Temp 98 °F (36 7 °C)   Resp 16   Wt 94 8 kg (209 lb)   SpO2 99%   BMI 32 73 kg/m²        Physical Exam     Physical Exam  Vitals and nursing note reviewed  Constitutional:       General: He is awake  Appearance: Normal appearance  He is overweight  HENT:      Head: Normocephalic and atraumatic  Right Ear: Hearing normal       Left Ear: Hearing normal       Mouth/Throat:      Lips: Pink  Mouth: Mucous membranes are moist    Cardiovascular:      Rate and Rhythm: Normal rate and regular rhythm  Pulses: Normal pulses  Heart sounds: Normal heart sounds     Pulmonary:      Effort: Pulmonary effort is normal       Breath sounds: Normal breath sounds  Musculoskeletal:         General: Tenderness present  No swelling, deformity or signs of injury  Right shoulder: Normal       Left shoulder: Tenderness present  No swelling, deformity, effusion, laceration, bony tenderness or crepitus  Normal range of motion  Normal strength  Normal pulse  Arms:       Cervical back: Normal range of motion and neck supple  Skin:     General: Skin is warm and dry  Capillary Refill: Capillary refill takes less than 2 seconds  Findings: No abscess, bruising, ecchymosis or signs of injury  Neurological:      General: No focal deficit present  Mental Status: He is alert and oriented to person, place, and time  Psychiatric:         Mood and Affect: Mood normal          Behavior: Behavior normal  Behavior is cooperative  Thought Content:  Thought content normal          Judgment: Judgment normal

## 2023-06-21 NOTE — PATIENT INSTRUCTIONS
Tylenol and lidocaine patches as needed throughout the day, robaxin at night  Wear sling during the day for the next week  Follow-up with physical therapy and orthopedics for further management  Proceed to  ER if symptoms worsen

## 2023-06-24 ENCOUNTER — APPOINTMENT (OUTPATIENT)
Dept: LAB | Facility: CLINIC | Age: 73
End: 2023-06-24
Payer: MEDICARE

## 2023-06-24 DIAGNOSIS — L40.50 PSA (PSORIATIC ARTHRITIS) (HCC): ICD-10-CM

## 2023-06-24 DIAGNOSIS — I10 ESSENTIAL HYPERTENSION: ICD-10-CM

## 2023-06-24 DIAGNOSIS — E27.8 ABNORMALITY OF CORTISOL-BINDING GLOBULIN (HCC): Primary | ICD-10-CM

## 2023-06-24 LAB
ALBUMIN SERPL BCP-MCNC: 3.5 G/DL (ref 3.5–5)
ALP SERPL-CCNC: 45 U/L (ref 46–116)
ALT SERPL W P-5'-P-CCNC: 22 U/L (ref 12–78)
ANION GAP SERPL CALCULATED.3IONS-SCNC: 3 MMOL/L
AST SERPL W P-5'-P-CCNC: 17 U/L (ref 5–45)
BILIRUB SERPL-MCNC: 0.57 MG/DL (ref 0.2–1)
BUN SERPL-MCNC: 17 MG/DL (ref 5–25)
CALCIUM SERPL-MCNC: 8.9 MG/DL (ref 8.3–10.1)
CHLORIDE SERPL-SCNC: 101 MMOL/L (ref 96–108)
CHOLEST SERPL-MCNC: 187 MG/DL
CO2 SERPL-SCNC: 28 MMOL/L (ref 21–32)
CORTIS SERPL-MCNC: 7.8 UG/DL
CREAT SERPL-MCNC: 1.04 MG/DL (ref 0.6–1.3)
GFR SERPL CREATININE-BSD FRML MDRD: 70 ML/MIN/1.73SQ M
GLUCOSE P FAST SERPL-MCNC: 100 MG/DL (ref 65–99)
HDLC SERPL-MCNC: 87 MG/DL
LDLC SERPL CALC-MCNC: 86 MG/DL (ref 0–100)
NONHDLC SERPL-MCNC: 100 MG/DL
POTASSIUM SERPL-SCNC: 4.1 MMOL/L (ref 3.5–5.3)
PROT SERPL-MCNC: 7.1 G/DL (ref 6.4–8.4)
PSA SERPL-MCNC: 0.4 NG/ML (ref 0–4)
SODIUM SERPL-SCNC: 132 MMOL/L (ref 135–147)
T3 SERPL-MCNC: 0.6 NG/ML
T4 SERPL-MCNC: 6.62 UG/DL (ref 6.09–12.23)
TRIGL SERPL-MCNC: 68 MG/DL
TSH SERPL DL<=0.05 MIU/L-ACNC: 3.23 UIU/ML (ref 0.45–4.5)
URATE SERPL-MCNC: 4.7 MG/DL (ref 3.5–8.5)

## 2023-06-24 PROCEDURE — 82024 ASSAY OF ACTH: CPT

## 2023-06-24 PROCEDURE — 84153 ASSAY OF PSA TOTAL: CPT

## 2023-06-24 PROCEDURE — 80053 COMPREHEN METABOLIC PANEL: CPT

## 2023-06-24 PROCEDURE — 36415 COLL VENOUS BLD VENIPUNCTURE: CPT

## 2023-06-24 PROCEDURE — 84436 ASSAY OF TOTAL THYROXINE: CPT

## 2023-06-24 PROCEDURE — 84550 ASSAY OF BLOOD/URIC ACID: CPT

## 2023-06-24 PROCEDURE — 84443 ASSAY THYROID STIM HORMONE: CPT

## 2023-06-24 PROCEDURE — 80061 LIPID PANEL: CPT

## 2023-06-24 PROCEDURE — 86618 LYME DISEASE ANTIBODY: CPT

## 2023-06-24 PROCEDURE — 84480 ASSAY TRIIODOTHYRONINE (T3): CPT

## 2023-06-24 PROCEDURE — 82533 TOTAL CORTISOL: CPT

## 2023-06-25 LAB — B BURGDOR IGG+IGM SER-ACNC: <0.2 AI

## 2023-06-28 LAB — ACTH PLAS-MCNC: 20.2 PG/ML (ref 7.2–63.3)

## 2023-10-05 ENCOUNTER — APPOINTMENT (OUTPATIENT)
Dept: LAB | Facility: CLINIC | Age: 73
End: 2023-10-05
Payer: MEDICARE

## 2023-10-05 DIAGNOSIS — E46 PROTEIN-CALORIE MALNUTRITION, UNSPECIFIED SEVERITY (HCC): ICD-10-CM

## 2023-10-05 DIAGNOSIS — D50.9 MICROCYTIC ANEMIA: ICD-10-CM

## 2023-10-05 LAB
BASOPHILS # BLD AUTO: 0.06 THOUSANDS/ÂΜL (ref 0–0.1)
BASOPHILS NFR BLD AUTO: 1 % (ref 0–1)
CRP SERPL QL: 1 MG/L
EOSINOPHIL # BLD AUTO: 0.25 THOUSAND/ÂΜL (ref 0–0.61)
EOSINOPHIL NFR BLD AUTO: 5 % (ref 0–6)
ERYTHROCYTE [DISTWIDTH] IN BLOOD BY AUTOMATED COUNT: 12.5 % (ref 11.6–15.1)
ERYTHROCYTE [SEDIMENTATION RATE] IN BLOOD: 20 MM/HOUR (ref 0–19)
FERRITIN SERPL-MCNC: 155 NG/ML (ref 24–336)
HCT VFR BLD AUTO: 33.6 % (ref 36.5–49.3)
HGB BLD-MCNC: 11.4 G/DL (ref 12–17)
IMM GRANULOCYTES # BLD AUTO: 0.01 THOUSAND/UL (ref 0–0.2)
IMM GRANULOCYTES NFR BLD AUTO: 0 % (ref 0–2)
LDH SERPL-CCNC: 160 U/L (ref 140–271)
LYMPHOCYTES # BLD AUTO: 1.23 THOUSANDS/ÂΜL (ref 0.6–4.47)
LYMPHOCYTES NFR BLD AUTO: 25 % (ref 14–44)
MCH RBC QN AUTO: 33.8 PG (ref 26.8–34.3)
MCHC RBC AUTO-ENTMCNC: 33.9 G/DL (ref 31.4–37.4)
MCV RBC AUTO: 100 FL (ref 82–98)
MONOCYTES # BLD AUTO: 0.75 THOUSAND/ÂΜL (ref 0.17–1.22)
MONOCYTES NFR BLD AUTO: 15 % (ref 4–12)
NEUTROPHILS # BLD AUTO: 2.65 THOUSANDS/ÂΜL (ref 1.85–7.62)
NEUTS SEG NFR BLD AUTO: 54 % (ref 43–75)
NRBC BLD AUTO-RTO: 0 /100 WBCS
PLATELET # BLD AUTO: 362 THOUSANDS/UL (ref 149–390)
PMV BLD AUTO: 9.1 FL (ref 8.9–12.7)
RBC # BLD AUTO: 3.37 MILLION/UL (ref 3.88–5.62)
RETICS # AUTO: NORMAL 10*3/UL (ref 14356–105094)
RETICS # CALC: 1.59 % (ref 0.37–1.87)
VIT B12 SERPL-MCNC: 319 PG/ML (ref 180–914)
WBC # BLD AUTO: 4.95 THOUSAND/UL (ref 4.31–10.16)

## 2023-10-05 PROCEDURE — 85045 AUTOMATED RETICULOCYTE COUNT: CPT

## 2023-10-05 PROCEDURE — 82728 ASSAY OF FERRITIN: CPT

## 2023-10-05 PROCEDURE — 85652 RBC SED RATE AUTOMATED: CPT

## 2023-10-05 PROCEDURE — 86140 C-REACTIVE PROTEIN: CPT

## 2023-10-05 PROCEDURE — 85025 COMPLETE CBC W/AUTO DIFF WBC: CPT

## 2023-10-05 PROCEDURE — 82607 VITAMIN B-12: CPT

## 2023-10-05 PROCEDURE — 83615 LACTATE (LD) (LDH) ENZYME: CPT

## 2023-10-05 PROCEDURE — 83010 ASSAY OF HAPTOGLOBIN QUANT: CPT

## 2023-10-05 PROCEDURE — 84165 PROTEIN E-PHORESIS SERUM: CPT

## 2023-10-05 PROCEDURE — 83918 ORGANIC ACIDS TOTAL QUANT: CPT

## 2023-10-05 PROCEDURE — 36415 COLL VENOUS BLD VENIPUNCTURE: CPT

## 2023-10-06 LAB — HAPTOGLOB SERPL-MCNC: 143 MG/DL (ref 34–355)

## 2023-10-07 LAB
ALBUMIN SERPL ELPH-MCNC: 3.85 G/DL (ref 3.2–5.1)
ALBUMIN SERPL ELPH-MCNC: 61.1 % (ref 48–70)
ALPHA1 GLOB SERPL ELPH-MCNC: 0.24 G/DL (ref 0.15–0.47)
ALPHA1 GLOB SERPL ELPH-MCNC: 3.8 % (ref 1.8–7)
ALPHA2 GLOB SERPL ELPH-MCNC: 0.61 G/DL (ref 0.42–1.04)
ALPHA2 GLOB SERPL ELPH-MCNC: 9.7 % (ref 5.9–14.9)
BETA GLOB ABNORMAL SERPL ELPH-MCNC: 0.39 G/DL (ref 0.31–0.57)
BETA1 GLOB SERPL ELPH-MCNC: 6.2 % (ref 4.7–7.7)
BETA2 GLOB SERPL ELPH-MCNC: 4.9 % (ref 3.1–7.9)
BETA2+GAMMA GLOB SERPL ELPH-MCNC: 0.31 G/DL (ref 0.2–0.58)
GAMMA GLOB ABNORMAL SERPL ELPH-MCNC: 0.9 G/DL (ref 0.4–1.66)
GAMMA GLOB SERPL ELPH-MCNC: 14.3 % (ref 6.9–22.3)
IGG/ALB SER: 1.57 {RATIO} (ref 1.1–1.8)
PROT PATTERN SERPL ELPH-IMP: ABNORMAL
PROT SERPL-MCNC: 6.3 G/DL (ref 6.4–8.4)

## 2023-10-07 PROCEDURE — 84165 PROTEIN E-PHORESIS SERUM: CPT | Performed by: STUDENT IN AN ORGANIZED HEALTH CARE EDUCATION/TRAINING PROGRAM

## 2023-10-09 LAB — METHYLMALONATE SERPL-SCNC: 186 NMOL/L (ref 0–378)

## 2023-11-16 NOTE — PROGRESS NOTES
Progress Note - General Surgery   Royal Inman 75 y.o. male MRN: [id number]  Encounter: [id number]    Assessment/Plan    Gastroesophageal reflux disease without esophagitis  Returns the office for 1 year follow-up after the last EGD which confirmed Roque's esophagus without dysplasia. Continues to have frequent intermittent reflux symptoms worse when eating close to bedtime and exacerbated by peppers. Currently taking omeprazole 20 mg once in the morning and occasionally Gaviscon as needed. We discussed options for management including repeat EGD now versus trial of increased omeprazole to 20 mg twice daily. Agreeable to medication adjustment, we will see back in about 3 months to assess the response, advised that if he continues to have persistent symptoms would suggest we repeat EGD at that time. Questions answered, agreeable to the plan. General GERD guidelines including not eating within 3 hours of bedtime, sleeping with head of bed elevated, and avoiding all trigger foods were discussed. Umbilical hernia without obstruction and without gangrene  His chronic fat-containing reducible umbilical hernia is stable and asymptomatic. Remains reducible on exam today. We discussed pros and cons of elective repair, he wishes to observe it for now but agreeable to repair if symptoms develop or hernia progresses. We will plan to see back on as-needed basis for this. Diagnoses and all orders for this visit:    Umbilical hernia without obstruction and without gangrene    Gastroesophageal reflux disease without esophagitis  -     omeprazole (PriLOSEC) 20 mg delayed release capsule; Take 1 capsule (20 mg total) by mouth 2 (two) times a day before meals    Roque's esophagus without dysplasia  -     omeprazole (PriLOSEC) 20 mg delayed release capsule;  Take 1 capsule (20 mg total) by mouth 2 (two) times a day before meals        Subjective       Chief Complaint   Patient presents with    Follow-up     1 yr. f/u esophagus without dysplasia      Patient is coming in the office for 1 yr. f/u esophagus without dysplasia, no major concerns. He is also here for an UMB hernia. No fever/chills. ANGEL Mullins           Review of Systems   All other systems reviewed and are negative. The following portions of the patient's history were reviewed and updated as appropriate: allergies, current medications, past family history, past medical history, past social history, past surgical history, and problem list.    Objective      Blood pressure 142/78, pulse 78, temperature 97.9 °F (36.6 °C), temperature source Temporal, resp. rate 16, weight 94.8 kg (209 lb), SpO2 91 %. Physical Exam  Vitals and nursing note reviewed. Constitutional:       General: He is not in acute distress. Appearance: He is well-developed. He is not diaphoretic. HENT:      Head: Normocephalic and atraumatic. Eyes:      Conjunctiva/sclera: Conjunctivae normal.      Pupils: Pupils are equal, round, and reactive to light. Pulmonary:      Effort: No respiratory distress. Abdominal:      Comments: Abdomen soft, nondistended nontender, moderate-sized fat-containing umbilical hernia which is fully reducible and nontender to palpation. Musculoskeletal:         General: Normal range of motion. Cervical back: Normal range of motion. Skin:     General: Skin is warm and dry. Capillary Refill: Capillary refill takes less than 2 seconds. Neurological:      Mental Status: He is alert and oriented to person, place, and time.    Psychiatric:         Behavior: Behavior normal.         Signature:  Owen Bean PA-C  Date: 11/17/2023 Time: 11:07 AM

## 2023-11-17 ENCOUNTER — OFFICE VISIT (OUTPATIENT)
Dept: SURGERY | Facility: CLINIC | Age: 73
End: 2023-11-17
Payer: MEDICARE

## 2023-11-17 VITALS
SYSTOLIC BLOOD PRESSURE: 142 MMHG | RESPIRATION RATE: 16 BRPM | BODY MASS INDEX: 32.73 KG/M2 | TEMPERATURE: 97.9 F | WEIGHT: 209 LBS | HEART RATE: 78 BPM | OXYGEN SATURATION: 91 % | DIASTOLIC BLOOD PRESSURE: 78 MMHG

## 2023-11-17 DIAGNOSIS — K22.70 BARRETT'S ESOPHAGUS WITHOUT DYSPLASIA: ICD-10-CM

## 2023-11-17 DIAGNOSIS — K21.9 GASTROESOPHAGEAL REFLUX DISEASE WITHOUT ESOPHAGITIS: ICD-10-CM

## 2023-11-17 DIAGNOSIS — K42.9 UMBILICAL HERNIA WITHOUT OBSTRUCTION AND WITHOUT GANGRENE: Primary | ICD-10-CM

## 2023-11-17 PROCEDURE — 99213 OFFICE O/P EST LOW 20 MIN: CPT | Performed by: PHYSICIAN ASSISTANT

## 2023-11-17 RX ORDER — OMEPRAZOLE 20 MG/1
20 CAPSULE, DELAYED RELEASE ORAL
Qty: 90 CAPSULE | Refills: 3 | Status: SHIPPED | OUTPATIENT
Start: 2023-11-17 | End: 2024-05-15

## 2023-11-17 NOTE — ASSESSMENT & PLAN NOTE
Returns the office for 1 year follow-up after the last EGD which confirmed Roque's esophagus without dysplasia. Continues to have frequent intermittent reflux symptoms worse when eating close to bedtime and exacerbated by peppers. Currently taking omeprazole 20 mg once in the morning and occasionally Gaviscon as needed. We discussed options for management including repeat EGD now versus trial of increased omeprazole to 20 mg twice daily. Agreeable to medication adjustment, we will see back in about 3 months to assess the response, advised that if he continues to have persistent symptoms would suggest we repeat EGD at that time. Questions answered, agreeable to the plan. General GERD guidelines including not eating within 3 hours of bedtime, sleeping with head of bed elevated, and avoiding all trigger foods were discussed.

## 2023-11-17 NOTE — ASSESSMENT & PLAN NOTE
His chronic fat-containing reducible umbilical hernia is stable and asymptomatic. Remains reducible on exam today. We discussed pros and cons of elective repair, he wishes to observe it for now but agreeable to repair if symptoms develop or hernia progresses. We will plan to see back on as-needed basis for this.

## 2024-02-15 RX ORDER — AMOXICILLIN 500 MG/1
CAPSULE ORAL
COMMUNITY

## 2024-02-15 NOTE — PROGRESS NOTES
"Progress Note - General Surgery   Scottie Ca 73 y.o. male MRN: [id number]  Encounter: [id number]    Assessment/Plan    Umbilical hernia without obstruction and without gangrene  Stable, asymptomatic. Wishes to continue observation alone. Will plan to see back as needed with any changes or concerns.    Gastroesophageal reflux disease without esophagitis  Has made dietary changes. Specifically avoiding preserved meats, smoked/salted foods, and avoiding over eating at dinner time and has had resolution of GERD symptoms. Continues to take Omeprazole 20 mg once daily and then BID as needed. Abdomen soft and non-tender. Will place in recall for repeat EGD at time of his Colonoscopy in 09/2025.  Diagnoses and all orders for this visit:    Umbilical hernia without obstruction and without gangrene    Gastroesophageal reflux disease without esophagitis    Other orders  -     amoxicillin (AMOXIL) 500 mg capsule;  (Patient not taking: Reported on 2/16/2024)        Subjective       Chief Complaint   Patient presents with    Follow-up     3 mo f/u on esophagus without dysplasia      Patient is here for a 3 mo f/u on esophagus without dysplasia. Patient denies nausea/vomiting, trouble eating/drinking/ swallowing, SOB, heart burn, abd pain, diarrhea/constipation or fevers/chills. Maday.YULI REYES         Review of Systems   All other systems reviewed and are negative.      The following portions of the patient's history were reviewed and updated as appropriate: allergies, current medications, past family history, past medical history, past social history, past surgical history, and problem list.    Objective      Blood pressure 168/70, pulse 68, temperature 98.9 °F (37.2 °C), temperature source Temporal, resp. rate 16, height 5' 7\" (1.702 m), weight 93.9 kg (207 lb), SpO2 98%.   Physical Exam  Vitals and nursing note reviewed.   Constitutional:       General: He is not in acute distress.     Appearance: He is well-developed. He is " not diaphoretic.   HENT:      Head: Normocephalic and atraumatic.   Eyes:      Conjunctiva/sclera: Conjunctivae normal.      Pupils: Pupils are equal, round, and reactive to light.   Pulmonary:      Effort: No respiratory distress.   Abdominal:      Comments: Soft, flat, non-tender. Soft reducible umbilical hernia, non-tender.   Musculoskeletal:         General: Normal range of motion.      Cervical back: Normal range of motion.   Skin:     General: Skin is warm and dry.      Capillary Refill: Capillary refill takes less than 2 seconds.   Neurological:      Mental Status: He is alert and oriented to person, place, and time.   Psychiatric:         Behavior: Behavior normal.         Signature:  Owen Bean PA-C  Date: 2/16/2024 Time: 10:03 AM

## 2024-02-16 ENCOUNTER — OFFICE VISIT (OUTPATIENT)
Dept: SURGERY | Facility: CLINIC | Age: 74
End: 2024-02-16
Payer: MEDICARE

## 2024-02-16 VITALS
OXYGEN SATURATION: 98 % | DIASTOLIC BLOOD PRESSURE: 70 MMHG | HEART RATE: 68 BPM | HEIGHT: 67 IN | TEMPERATURE: 98.9 F | WEIGHT: 207 LBS | BODY MASS INDEX: 32.49 KG/M2 | SYSTOLIC BLOOD PRESSURE: 168 MMHG | RESPIRATION RATE: 16 BRPM

## 2024-02-16 DIAGNOSIS — K42.9 UMBILICAL HERNIA WITHOUT OBSTRUCTION AND WITHOUT GANGRENE: Primary | ICD-10-CM

## 2024-02-16 DIAGNOSIS — K21.9 GASTROESOPHAGEAL REFLUX DISEASE WITHOUT ESOPHAGITIS: ICD-10-CM

## 2024-02-16 PROCEDURE — 99213 OFFICE O/P EST LOW 20 MIN: CPT | Performed by: PHYSICIAN ASSISTANT

## 2024-02-16 NOTE — ASSESSMENT & PLAN NOTE
Stable, asymptomatic. Wishes to continue observation alone. Will plan to see back as needed with any changes or concerns.

## 2024-02-16 NOTE — ASSESSMENT & PLAN NOTE
Has made dietary changes. Specifically avoiding preserved meats, smoked/salted foods, and avoiding over eating at dinner time and has had resolution of GERD symptoms. Continues to take Omeprazole 20 mg once daily and then BID as needed. Abdomen soft and non-tender. Will place in recall for repeat EGD at time of his Colonoscopy in 09/2025.

## 2024-03-15 ENCOUNTER — APPOINTMENT (OUTPATIENT)
Dept: LAB | Facility: HOSPITAL | Age: 74
End: 2024-03-15
Payer: MEDICARE

## 2024-03-15 DIAGNOSIS — D53.9 MACROCYTIC ANEMIA: ICD-10-CM

## 2024-03-15 LAB
BASOPHILS # BLD AUTO: 0.05 THOUSANDS/ÂΜL (ref 0–0.1)
BASOPHILS NFR BLD AUTO: 1 % (ref 0–1)
EOSINOPHIL # BLD AUTO: 0.14 THOUSAND/ÂΜL (ref 0–0.61)
EOSINOPHIL NFR BLD AUTO: 3 % (ref 0–6)
ERYTHROCYTE [DISTWIDTH] IN BLOOD BY AUTOMATED COUNT: 12 % (ref 11.6–15.1)
FERRITIN SERPL-MCNC: 157 NG/ML (ref 24–336)
FOLATE SERPL-MCNC: >22.3 NG/ML
HCT VFR BLD AUTO: 33.3 % (ref 36.5–49.3)
HGB BLD-MCNC: 11.2 G/DL (ref 12–17)
IMM GRANULOCYTES # BLD AUTO: 0.02 THOUSAND/UL (ref 0–0.2)
IMM GRANULOCYTES NFR BLD AUTO: 0 % (ref 0–2)
LYMPHOCYTES # BLD AUTO: 1.28 THOUSANDS/ÂΜL (ref 0.6–4.47)
LYMPHOCYTES NFR BLD AUTO: 27 % (ref 14–44)
MCH RBC QN AUTO: 32.8 PG (ref 26.8–34.3)
MCHC RBC AUTO-ENTMCNC: 33.6 G/DL (ref 31.4–37.4)
MCV RBC AUTO: 98 FL (ref 82–98)
MONOCYTES # BLD AUTO: 0.87 THOUSAND/ÂΜL (ref 0.17–1.22)
MONOCYTES NFR BLD AUTO: 18 % (ref 4–12)
NEUTROPHILS # BLD AUTO: 2.39 THOUSANDS/ÂΜL (ref 1.85–7.62)
NEUTS SEG NFR BLD AUTO: 51 % (ref 43–75)
NRBC BLD AUTO-RTO: 0 /100 WBCS
PLATELET # BLD AUTO: 348 THOUSANDS/UL (ref 149–390)
PMV BLD AUTO: 8.4 FL (ref 8.9–12.7)
RBC # BLD AUTO: 3.41 MILLION/UL (ref 3.88–5.62)
RETICS # AUTO: NORMAL 10*3/UL (ref 14356–105094)
RETICS # CALC: 1.14 % (ref 0.37–1.87)
VIT B12 SERPL-MCNC: 259 PG/ML (ref 180–914)
WBC # BLD AUTO: 4.75 THOUSAND/UL (ref 4.31–10.16)

## 2024-03-15 PROCEDURE — 82746 ASSAY OF FOLIC ACID SERUM: CPT

## 2024-03-15 PROCEDURE — 82728 ASSAY OF FERRITIN: CPT

## 2024-03-15 PROCEDURE — 36415 COLL VENOUS BLD VENIPUNCTURE: CPT

## 2024-03-15 PROCEDURE — 83918 ORGANIC ACIDS TOTAL QUANT: CPT

## 2024-03-15 PROCEDURE — 82607 VITAMIN B-12: CPT

## 2024-03-15 PROCEDURE — 85045 AUTOMATED RETICULOCYTE COUNT: CPT

## 2024-03-15 PROCEDURE — 85025 COMPLETE CBC W/AUTO DIFF WBC: CPT

## 2024-03-25 LAB — METHYLMALONATE SERPL-SCNC: 230 NMOL/L (ref 0–378)

## 2024-04-30 ENCOUNTER — OFFICE VISIT (OUTPATIENT)
Dept: VASCULAR SURGERY | Facility: CLINIC | Age: 74
End: 2024-04-30
Payer: MEDICARE

## 2024-04-30 VITALS
BODY MASS INDEX: 31.96 KG/M2 | OXYGEN SATURATION: 98 % | TEMPERATURE: 97.6 F | HEIGHT: 67 IN | HEART RATE: 70 BPM | WEIGHT: 203.6 LBS | SYSTOLIC BLOOD PRESSURE: 146 MMHG | DIASTOLIC BLOOD PRESSURE: 76 MMHG

## 2024-04-30 DIAGNOSIS — I89.0 LYMPHEDEMA OF BOTH LOWER EXTREMITIES: Primary | ICD-10-CM

## 2024-04-30 PROCEDURE — 99203 OFFICE O/P NEW LOW 30 MIN: CPT | Performed by: SURGERY

## 2024-04-30 NOTE — PROGRESS NOTES
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, gout, LIH repair, umbo hernia, BLE lymphedema    Lymphedema of both lower extremities  -     Ambulatory Referral to PT/OT Lymphedema Therapy; Future    -based on appearance and symptoms, the BLE edema is primarily caused by lymphedema  -discussed lymphedema in general and management for control of swelling to avoid symptoms or skin issues; instructed to start daily compression, leg elevation, exercise, weight loss and skin care  -referred to lymphedema PT  -reviewed TTE from '21 which showed grade I diastolic disease; recommended he discuss repeating this with his PCP to see if this has progressed and if there is any role for diuretics  -f/u PRN    Subjective:      Patient ID: Scottie Ca is a 73 y.o. male.      Patient presents today as a new patient self referral for bilateral lower extremity swelling and pain pt had no recent testing. Pt reports swelling to both feet and ankles L>R . Pt does not wear compression. Pt is a non smoker.     HPI:    Patient presents for evaluation of edema.  Self-referred.    Patient complains of swelling in the legs, L>R.  This has been present for several years.  Denies provoking or relieving factors.  Denies any prior treatment.    Not currently wearing compression.  Did wear these about a year ago daily but can't get them on currently.  Does not elevate his legs.  He is active with yardwork.    Denies hx of blood clots.  Surgical hx of hernia repair, s/p R orchiectomy for cancer, metastatic to lung and had R middle lobectomy and chemo.        The following portions of the patient's history were reviewed and updated as appropriate: allergies, current medications, past family history, past medical history, past social history, past surgical history, and problem list.    Review of Systems   Constitutional: Negative.   "  HENT: Negative.     Eyes: Negative.    Respiratory:  Positive for shortness of breath.    Cardiovascular:  Positive for leg swelling.   Gastrointestinal: Negative.    Endocrine: Negative.    Genitourinary: Negative.    Musculoskeletal: Negative.    Skin: Negative.    Allergic/Immunologic: Negative.    Neurological: Negative.    Hematological:  Bruises/bleeds easily.   Psychiatric/Behavioral: Negative.           Objective:      /76 (BP Location: Right arm, Patient Position: Sitting, Cuff Size: Standard)   Pulse 70   Temp 97.6 °F (36.4 °C) (Temporal)   Ht 5' 7\" (1.702 m)   Wt 92.4 kg (203 lb 9.6 oz)   SpO2 98%   BMI 31.89 kg/m²          Physical Exam  Cardiovascular:      Rate and Rhythm: Normal rate and regular rhythm.      Pulses:           Radial pulses are 2+ on the right side and 2+ on the left side.        Dorsalis pedis pulses are 2+ on the right side and 2+ on the left side.        Posterior tibial pulses are 2+ on the right side and 2+ on the left side.      Heart sounds: No murmur heard.  Pulmonary:      Effort: No respiratory distress.      Breath sounds: No wheezing or rales.   Musculoskeletal:      Right lower le+ Edema present.      Left lower leg: 3+ Edema present.   Skin:     Comments: Hair loss on legs but otherwise no stasis changes; edema of the toes, foot, ankle, leg, and knees; thighs spared; no open wounds           I have reviewed and made appropriate changes to the review of systems input by the medical assistant.    Vitals:    24 1144   BP: 146/76   BP Location: Right arm   Patient Position: Sitting   Cuff Size: Standard   Pulse: 70   Temp: 97.6 °F (36.4 °C)   TempSrc: Temporal   SpO2: 98%   Weight: 92.4 kg (203 lb 9.6 oz)   Height: 5' 7\" (1.702 m)       Patient Active Problem List   Diagnosis   • Gastroesophageal reflux disease without esophagitis   • Roque's esophagus without dysplasia   • History of colon polyps   • Diverticular disease of large intestine   • " Encounter for screening colonoscopy   • Cyst of pancreas   • Gout   • History of squamous cell carcinoma of skin   • Hydrocele of testis   • Malignant neoplasm of descended right testis (HCC)   • Adenomatous polyp of cecum   • Prolapsed internal hemorrhoids, grade 3   • Polyp of hepatic flexure of colon   • Hyperplastic polyp of transverse colon   • Protein-calorie malnutrition, unspecified severity (HCC)   • SOB (shortness of breath)   • Syncope   • Chest pain   • Abnormal stress ECG   • Benign essential HTN   • History of testicular cancer   • Umbilical hernia without obstruction and without gangrene   • Lymphedema of both lower extremities       Past Surgical History:   Procedure Laterality Date   • COLONOSCOPY  2016   • CYSTOSCOPY W/ LASER LITHOTRIPSY  2003?   • EGD  2015    gabriele   • HERNIA REPAIR Left     x3 surgery   • LUNG REMOVAL, PARTIAL Right 2012    right middle   • SKIN BIOPSY     • TESTICLE SURGERY Right 2010    removal       Family History   Problem Relation Age of Onset   • Cancer Mother    • Cancer Father    • Thyroid cancer Brother    • Tongue cancer Brother        Social History     Socioeconomic History   • Marital status: /Civil Union     Spouse name: Not on file   • Number of children: Not on file   • Years of education: Not on file   • Highest education level: Not on file   Occupational History   • Not on file   Tobacco Use   • Smoking status: Never   • Smokeless tobacco: Former     Types: Chew   Vaping Use   • Vaping status: Never Used   Substance and Sexual Activity   • Alcohol use: Yes     Alcohol/week: 35.0 standard drinks of alcohol     Types: 35 Cans of beer per week     Comment: Socially   • Drug use: Never   • Sexual activity: Not on file   Other Topics Concern   • Not on file   Social History Narrative   • Not on file     Social Determinants of Health     Financial Resource Strain: Not on file   Food Insecurity: Not on file   Transportation Needs: Not on file   Physical  Activity: Not on file   Stress: Not on file   Social Connections: Not on file   Intimate Partner Violence: Not on file   Housing Stability: Not on file       No Known Allergies      Current Outpatient Medications:   •  CHERRY PO, Take 1 capsule by mouth every morning, Disp: , Rfl:   •  omeprazole (PriLOSEC) 20 mg delayed release capsule, Take 1 capsule (20 mg total) by mouth 2 (two) times a day before meals, Disp: 90 capsule, Rfl: 3  •  amLODIPine (NORVASC) 5 mg tablet, Take 1 tablet (5 mg total) by mouth daily for 14 days, Disp: 14 tablet, Rfl: 0  •  amoxicillin (AMOXIL) 500 mg capsule, , Disp: , Rfl:   •  methocarbamol (ROBAXIN) 500 mg tablet, Take 1 tablet (500 mg total) by mouth daily at bedtime as needed for muscle spasms (Patient not taking: Reported on 2/16/2024), Disp: 7 tablet, Rfl: 0  •  sodium chloride 1 g tablet, Take 1 tablet (1 g total) by mouth 3 (three) times a day (Patient not taking: Reported on 12/13/2021), Disp: 9 tablet, Rfl: 0  •  Suprep Bowel Prep Kit 17.5-3.13-1.6 GM/177ML SOLN, Take 1 kit by mouth once for 1 dose (Patient not taking: Reported on 1/25/2022 ), Disp: 1 Bottle, Rfl: 0  •  valACYclovir (VALTREX) 1,000 mg tablet, Take 1 tablet (1,000 mg total) by mouth 3 (three) times a day for 7 days (Patient not taking: Reported on 1/25/2022 ), Disp: 21 tablet, Rfl: 0

## 2024-04-30 NOTE — PATIENT INSTRUCTIONS
Lymphedema:  1) Wear compression on both legs daily (either your compression sock or an ACE wrap); look for 20-30mmHg, knee high, measure your leg to get the right size (this might be two different sizes on your two legs)  2) Elevate your legs as much as possible and avoid sitting and standing still for long periods of time  3) Try to add in a walk every day, shoot for at least 20min of continuous walking  4) Try to work on healthy diet and weight loss  5) Use a moisturizing cream daily to keep the skin soft  6) I am referring you to lymphedema physical therapy to try to reduce the swelling and get it under control  7) Please talk to your PCP about repeating a heart echo to check for any heart disease that might be contributing to your swelling

## 2024-09-10 ENCOUNTER — APPOINTMENT (OUTPATIENT)
Dept: LAB | Facility: HOSPITAL | Age: 74
End: 2024-09-10
Payer: MEDICARE

## 2024-09-10 DIAGNOSIS — I10 HYPERTENSION, UNSPECIFIED TYPE: ICD-10-CM

## 2024-09-10 DIAGNOSIS — R97.20 ELEVATED PROSTATE SPECIFIC ANTIGEN (PSA): ICD-10-CM

## 2024-09-10 LAB
ALBUMIN SERPL BCG-MCNC: 3.9 G/DL (ref 3.5–5)
ALP SERPL-CCNC: 36 U/L (ref 34–104)
ALT SERPL W P-5'-P-CCNC: 15 U/L (ref 7–52)
ANION GAP SERPL CALCULATED.3IONS-SCNC: 7 MMOL/L (ref 4–13)
AST SERPL W P-5'-P-CCNC: 20 U/L (ref 13–39)
BASOPHILS # BLD AUTO: 0.06 THOUSANDS/ÂΜL (ref 0–0.1)
BASOPHILS NFR BLD AUTO: 1 % (ref 0–1)
BILIRUB SERPL-MCNC: 0.91 MG/DL (ref 0.2–1)
BUN SERPL-MCNC: 11 MG/DL (ref 5–25)
CALCIUM SERPL-MCNC: 9.2 MG/DL (ref 8.4–10.2)
CHLORIDE SERPL-SCNC: 97 MMOL/L (ref 96–108)
CHOLEST SERPL-MCNC: 197 MG/DL
CO2 SERPL-SCNC: 27 MMOL/L (ref 21–32)
CREAT SERPL-MCNC: 0.83 MG/DL (ref 0.6–1.3)
EOSINOPHIL # BLD AUTO: 0.19 THOUSAND/ÂΜL (ref 0–0.61)
EOSINOPHIL NFR BLD AUTO: 4 % (ref 0–6)
ERYTHROCYTE [DISTWIDTH] IN BLOOD BY AUTOMATED COUNT: 12.5 % (ref 11.6–15.1)
GFR SERPL CREATININE-BSD FRML MDRD: 86 ML/MIN/1.73SQ M
GLUCOSE P FAST SERPL-MCNC: 106 MG/DL (ref 65–99)
HCT VFR BLD AUTO: 32.6 % (ref 36.5–49.3)
HDLC SERPL-MCNC: 91 MG/DL
HGB BLD-MCNC: 11.2 G/DL (ref 12–17)
IMM GRANULOCYTES # BLD AUTO: 0.02 THOUSAND/UL (ref 0–0.2)
IMM GRANULOCYTES NFR BLD AUTO: 0 % (ref 0–2)
LDLC SERPL CALC-MCNC: 98 MG/DL (ref 0–100)
LYMPHOCYTES # BLD AUTO: 1.11 THOUSANDS/ÂΜL (ref 0.6–4.47)
LYMPHOCYTES NFR BLD AUTO: 24 % (ref 14–44)
MCH RBC QN AUTO: 33.6 PG (ref 26.8–34.3)
MCHC RBC AUTO-ENTMCNC: 34.4 G/DL (ref 31.4–37.4)
MCV RBC AUTO: 98 FL (ref 82–98)
MONOCYTES # BLD AUTO: 0.72 THOUSAND/ÂΜL (ref 0.17–1.22)
MONOCYTES NFR BLD AUTO: 16 % (ref 4–12)
NEUTROPHILS # BLD AUTO: 2.47 THOUSANDS/ÂΜL (ref 1.85–7.62)
NEUTS SEG NFR BLD AUTO: 55 % (ref 43–75)
NONHDLC SERPL-MCNC: 106 MG/DL
NRBC BLD AUTO-RTO: 0 /100 WBCS
PLATELET # BLD AUTO: 310 THOUSANDS/UL (ref 149–390)
PMV BLD AUTO: 8.2 FL (ref 8.9–12.7)
POTASSIUM SERPL-SCNC: 4 MMOL/L (ref 3.5–5.3)
PROT SERPL-MCNC: 6.6 G/DL (ref 6.4–8.4)
PSA SERPL-MCNC: 0.64 NG/ML (ref 0–4)
RBC # BLD AUTO: 3.33 MILLION/UL (ref 3.88–5.62)
SODIUM SERPL-SCNC: 131 MMOL/L (ref 135–147)
T3 SERPL-MCNC: 0.7 NG/ML
T4 SERPL-MCNC: 6.72 UG/DL (ref 6.09–12.23)
TRIGL SERPL-MCNC: 40 MG/DL
TSH SERPL DL<=0.05 MIU/L-ACNC: 2.49 UIU/ML (ref 0.45–4.5)
URATE SERPL-MCNC: 4.8 MG/DL (ref 3.5–8.5)
WBC # BLD AUTO: 4.57 THOUSAND/UL (ref 4.31–10.16)

## 2024-09-10 PROCEDURE — 80053 COMPREHEN METABOLIC PANEL: CPT

## 2024-09-10 PROCEDURE — 84443 ASSAY THYROID STIM HORMONE: CPT

## 2024-09-10 PROCEDURE — G0103 PSA SCREENING: HCPCS

## 2024-09-10 PROCEDURE — 84436 ASSAY OF TOTAL THYROXINE: CPT

## 2024-09-10 PROCEDURE — 80061 LIPID PANEL: CPT

## 2024-09-10 PROCEDURE — 84480 ASSAY TRIIODOTHYRONINE (T3): CPT

## 2024-09-10 PROCEDURE — 85025 COMPLETE CBC W/AUTO DIFF WBC: CPT

## 2024-09-10 PROCEDURE — 84550 ASSAY OF BLOOD/URIC ACID: CPT

## 2024-09-10 PROCEDURE — 36415 COLL VENOUS BLD VENIPUNCTURE: CPT

## 2024-12-27 DIAGNOSIS — K21.9 GASTROESOPHAGEAL REFLUX DISEASE WITHOUT ESOPHAGITIS: ICD-10-CM

## 2024-12-27 DIAGNOSIS — K22.70 BARRETT'S ESOPHAGUS WITHOUT DYSPLASIA: ICD-10-CM

## 2025-07-22 DIAGNOSIS — K22.70 BARRETT'S ESOPHAGUS WITHOUT DYSPLASIA: ICD-10-CM

## 2025-07-22 DIAGNOSIS — K21.9 GASTROESOPHAGEAL REFLUX DISEASE WITHOUT ESOPHAGITIS: ICD-10-CM

## 2025-07-23 RX ORDER — OMEPRAZOLE 20 MG/1
20 CAPSULE, DELAYED RELEASE ORAL
Qty: 90 CAPSULE | Refills: 0 | Status: SHIPPED | OUTPATIENT
Start: 2025-07-23 | End: 2026-01-19

## 2025-08-21 DIAGNOSIS — K21.9 GASTROESOPHAGEAL REFLUX DISEASE WITHOUT ESOPHAGITIS: Primary | ICD-10-CM

## 2025-08-21 DIAGNOSIS — K22.70 BARRETT'S ESOPHAGUS WITHOUT DYSPLASIA: ICD-10-CM

## 2025-08-21 DIAGNOSIS — Z12.11 SCREENING FOR COLON CANCER: ICD-10-CM
